# Patient Record
Sex: MALE | Race: WHITE | Employment: UNEMPLOYED | URBAN - METROPOLITAN AREA
[De-identification: names, ages, dates, MRNs, and addresses within clinical notes are randomized per-mention and may not be internally consistent; named-entity substitution may affect disease eponyms.]

---
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
## 2021-04-14 ENCOUNTER — OFFICE VISIT (OUTPATIENT)
Dept: ENT CLINIC | Age: 21
End: 2021-04-14
Payer: COMMERCIAL

## 2021-04-14 VITALS
TEMPERATURE: 98.8 F | HEART RATE: 63 BPM | BODY MASS INDEX: 23.76 KG/M2 | HEIGHT: 72 IN | OXYGEN SATURATION: 98 % | SYSTOLIC BLOOD PRESSURE: 111 MMHG | WEIGHT: 175.4 LBS | DIASTOLIC BLOOD PRESSURE: 78 MMHG

## 2021-04-14 DIAGNOSIS — J34.89 NASAL MASS: Primary | ICD-10-CM

## 2021-04-14 DIAGNOSIS — J30.81 ALLERGIC RHINITIS DUE TO ANIMAL HAIR AND DANDER: ICD-10-CM

## 2021-04-14 PROCEDURE — 99203 OFFICE O/P NEW LOW 30 MIN: CPT | Performed by: OTOLARYNGOLOGY

## 2021-04-14 RX ORDER — METHYLPREDNISOLONE 4 MG/1
TABLET ORAL
Qty: 1 KIT | Refills: 0 | Status: SHIPPED | OUTPATIENT
Start: 2021-04-14 | End: 2021-04-28 | Stop reason: ALTCHOICE

## 2021-04-14 RX ORDER — FLUTICASONE PROPIONATE 44 MCG
AEROSOL WITH ADAPTER (GRAM) INHALATION
COMMUNITY
Start: 2021-02-12

## 2021-04-14 RX ORDER — AZELASTINE 1 MG/ML
SPRAY, METERED NASAL
COMMUNITY
Start: 2021-03-27

## 2021-04-14 ASSESSMENT — ENCOUNTER SYMPTOMS
SHORTNESS OF BREATH: 0
STRIDOR: 0
ANAL BLEEDING: 0
EYE DISCHARGE: 0
VOICE CHANGE: 0
TROUBLE SWALLOWING: 0
CHOKING: 0
CHEST TIGHTNESS: 0
NAUSEA: 0
PHOTOPHOBIA: 0
DIARRHEA: 0
FACIAL SWELLING: 0
VOMITING: 0
WHEEZING: 0
RHINORRHEA: 0
SORE THROAT: 0
EYE REDNESS: 0
EYE PAIN: 0
SINUS PAIN: 0
ABDOMINAL DISTENTION: 0
EYE ITCHING: 0
RECTAL PAIN: 0
COUGH: 0
BLOOD IN STOOL: 0
ABDOMINAL PAIN: 0
APNEA: 0
CONSTIPATION: 0
SINUS PRESSURE: 0
COLOR CHANGE: 0
BACK PAIN: 0

## 2021-04-14 NOTE — PROGRESS NOTES
Review of Systems   Constitutional: Negative for activity change, appetite change, chills, diaphoresis, fatigue, fever and unexpected weight change. HENT: Negative for congestion, dental problem, drooling, ear discharge, ear pain, facial swelling, hearing loss, mouth sores, nosebleeds, postnasal drip, rhinorrhea, sinus pressure, sinus pain, sneezing, sore throat, tinnitus, trouble swallowing and voice change. Eyes: Negative for photophobia, pain, discharge, redness, itching and visual disturbance. Respiratory: Negative for apnea, cough, choking, chest tightness, shortness of breath, wheezing and stridor. Cardiovascular: Negative for chest pain, palpitations and leg swelling. Gastrointestinal: Negative for abdominal distention, abdominal pain, anal bleeding, blood in stool, constipation, diarrhea, nausea, rectal pain and vomiting. Endocrine: Negative for cold intolerance, heat intolerance, polydipsia, polyphagia and polyuria. Genitourinary: Negative for decreased urine volume, difficulty urinating, discharge, dysuria, enuresis, flank pain, frequency, genital sores, hematuria, penile pain, penile swelling, scrotal swelling, testicular pain and urgency. Musculoskeletal: Negative for arthralgias, back pain, gait problem, joint swelling, myalgias, neck pain and neck stiffness. Skin: Negative for color change, pallor, rash and wound. Allergic/Immunologic: Positive for environmental allergies. Negative for food allergies and immunocompromised state. Neurological: Negative for dizziness, tremors, seizures, syncope, facial asymmetry, speech difficulty, weakness, light-headedness, numbness and headaches. Hematological: Negative for adenopathy. Does not bruise/bleed easily. Psychiatric/Behavioral: Negative for agitation, behavioral problems, confusion, decreased concentration, dysphoric mood, hallucinations, self-injury, sleep disturbance and suicidal ideas.  The patient is not nervous/anxious and is not hyperactive.

## 2021-04-14 NOTE — PROGRESS NOTES
[facility] [address] [place] [address] [facility]  [address] [address]  Dept: [phone]  MD Dima Rios 24 y.o. male     Patient presents with a chief complaint of New Patient (Nasal polyps, Patient noticed it about a month ago, referred by Serena DIAMOND)       /78 (Site: Right Upper Arm, Position: Sitting, Cuff Size: Medium Adult)   Pulse 63   Temp 98.8 °F (37.1 °C) (Temporal)   Ht 6' (1.829 m)   Wt 175 lb 6.4 oz (79.6 kg)   SpO2 98%   BMI 23.79 kg/m²       History of Presenting Illness: The patient/caregiver reports a history of complaint with the following features: Onset:  1 month ago Pt noticed a polyp in right side of nose  Timing:  Year round allergies. Duration:  Constant polyp since he noticed it. Quality:  Nasal polyp right side of nose. Nasal congestion bilaterally. Rarely can smell. Decreased taste as well. Denies nasal bleeding. Denies nasal drainage. Location:  Nose   Severity:  Trouble smelling, trouble breathing through nose  Risk factors:   Pt denies prior sinus imaging. Denies Hx of nasal or sinus surgery. Hx of environmental/seasonal allergies. Has seen the allergist and has had allergy testing. Pt brought some records with him. Maternal grandfather with Hx of nasal/sinus polyp that he had removed. Denies Hx of nasal, facial, sinus or head injury. Alleviating factors:  Using Flonase (using x couple years), azelastine, Zytrec, Singulair. Improved allergy symptoms since using azelastine x 2 weeks, but still congested. Hasn't tried saline irrigations. No relief with amoxicillin. Indicates hasn't tried an oral steroid before. Aggravating factors: Allergies   Associated factors:   Denies fever or chills. Review of systems covering 10 systems is reviewed as staff entry in other note and pertinent positives and negatives noted.     Past Medical History:   Diagnosis Date    Allergic Current Outpatient Medications:     FLOVENT HFA 44 MCG/ACT inhaler, , Disp: , Rfl:     fluticasone (VERAMYST) 27.5 MCG/SPRAY nasal spray, 2 sprays by Each Nostril route daily, Disp: , Rfl:     methylPREDNISolone (MEDROL DOSEPACK) 4 MG tablet, Take by mouth., Disp: 1 kit, Rfl: 0    montelukast (SINGULAIR) 10 MG tablet, Take 10 mg by mouth nightly, Disp: , Rfl:     beclomethasone (QVAR) 80 MCG/ACT inhaler, Inhale 2 puffs into the lungs daily, Disp: , Rfl:     loratadine (CLARITIN) 10 MG tablet, Take 10 mg by mouth daily, Disp: , Rfl:     azelastine (ASTELIN) 0.1 % nasal spray, INSTILL 2 SPRAYS INTO EACH NOSTRIL TWICE A DAY, Disp: , Rfl:     albuterol sulfate  (90 BASE) MCG/ACT inhaler, Inhale 2 puffs into the lungs every 6 hours as needed for Wheezing Takes before games, basketball, Disp: , Rfl:    No Known Allergies   Past Surgical History:   Procedure Laterality Date    MS REMOVAL OF NAIL PLATE        Social History     Socioeconomic History    Marital status: Single     Spouse name: Not on file    Number of children: Not on file    Years of education: Not on file    Highest education level: Not on file   Occupational History    Not on file   Social Needs    Financial resource strain: Not on file    Food insecurity     Worry: Not on file     Inability: Not on file    Transportation needs     Medical: Not on file     Non-medical: Not on file   Tobacco Use    Smoking status: Never Smoker    Smokeless tobacco: Never Used   Substance and Sexual Activity    Alcohol use: Not on file    Drug use: Not on file    Sexual activity: Not on file   Lifestyle    Physical activity     Days per week: Not on file     Minutes per session: Not on file    Stress: Not on file   Relationships    Social connections     Talks on phone: Not on file     Gets together: Not on file     Attends Pent[employer]al service: Not on file     Active member of club or organization: Not on file     Attends meetings of clubs translucent, mobile to pneumatic otoscopy, no perforation    Left Tympanic Membrane: normal landmarks, translucent, mobile to pneumatic otoscopy, no perforation    Hearing: intact to spoken voice, intact to finger rub    NOSE:    Nasal Skin: no lesions, no lacerations, no scars    Nasal Dorsum: symmetric with no visible or palpable deformities    Nasal Tip: normal symmetric nasal tip, normal nasal valves    Nasal Mucosa: large obstructing right nasal polyp, bilateral boggy pale mucosa    Septum: not markedly deformed, midline, no exposed vessels, no bleeding, no septal granuloma    Turbinates: normal size and conformation    Nasopharynx: normal    ORAL CAVITY/MOUTH:    Lips, teeth, gums: normal lips, normal gums, dentition intact, no dental pain on palpation    Oral Mucosa: normal, moist, no lesions    Palate: normal hard palate, normal soft palate, symmetric palatal elevation    Floor of Mouth: normal floor of mouth    Tongue: normal tongue, no lesions, no edema, no masses, normal mucosa, mobile    Tonsils: normal tonsils, symmetric, no lesions    Posterior pharynx: normal    NECK:    Neck: no masses, trachea midline, normal range of motion, no cysts or pits, no tenderness to palpation    Thyroid: normal thyroid, no enlargement, no tenderness, no nodules    LYMPH NODES:    Cervical: no palpable lymph node enlargement    SKIN:    General Appearance: no lesions, warm and dry, normal turgor, no bruising    NEUROLOGICAL SYSTEM:    Orientation: oriented to time, oriented to place, oriented to person    Cranial Nerves: Cranial Nerves II-XII intact, normal facial movement    PSYCHIATRIC:    Mood and affect: normal mood, normal affect          Assessment and Plan:    He present with a large right nasal polyp dn although bleeding is denied, the risk of  Juvenile nasopharyngeal angiofibroma is raised with CT with contrast advised.   Given his family history however, this seems an unlikely cause and a course of oral steroid for presumed nasal polyps is offered. Diagnosis Orders   1. Nasal mass  CT SINUS W CONTRAST    methylPREDNISolone (MEDROL DOSEPACK) 4 MG tablet   2. Allergic rhinitis due to animal hair and dander        Return in about 2 weeks (around 4/28/2021). The patient and/or caregiver is to notify the office if no improvement or worsening of symptoms is noted prior to the scheduled follow-up for sooner evaluation. The patient and/or caregiver is able to state an understanding of these recommendations and is agreeable to the treatment plan. --Lena Collazo MD on 4/14/2021 at 1:55 PM    An electronic signature was used to authenticate this note.

## 2021-04-19 ENCOUNTER — HOSPITAL ENCOUNTER (OUTPATIENT)
Dept: CT IMAGING | Age: 21
Discharge: HOME | End: 2021-04-21
Payer: COMMERCIAL

## 2021-04-19 DIAGNOSIS — J34.89 NASAL MASS: ICD-10-CM

## 2021-04-19 PROCEDURE — 6360000004 HC RX CONTRAST MEDICATION: Performed by: OTOLARYNGOLOGY

## 2021-04-19 PROCEDURE — 70487 CT MAXILLOFACIAL W/DYE: CPT

## 2021-04-19 RX ADMIN — IOPAMIDOL 75 ML: 755 INJECTION, SOLUTION INTRAVENOUS at 17:11

## 2021-04-28 ENCOUNTER — OFFICE VISIT (OUTPATIENT)
Dept: ENT CLINIC | Age: 21
End: 2021-04-28
Payer: COMMERCIAL

## 2021-04-28 VITALS — TEMPERATURE: 98.8 F | RESPIRATION RATE: 16 BRPM | HEIGHT: 72 IN | WEIGHT: 175 LBS | BODY MASS INDEX: 23.7 KG/M2

## 2021-04-28 DIAGNOSIS — J32.3 CHRONIC SPHENOIDAL SINUSITIS: ICD-10-CM

## 2021-04-28 DIAGNOSIS — J32.0 CHRONIC SINUSITIS OF BOTH MAXILLARY SINUSES: ICD-10-CM

## 2021-04-28 DIAGNOSIS — J32.1 CHRONIC FRONTAL SINUSITIS: ICD-10-CM

## 2021-04-28 DIAGNOSIS — J33.9 NASAL POLYPS: Primary | ICD-10-CM

## 2021-04-28 DIAGNOSIS — J32.2 CHRONIC ETHMOIDAL SINUSITIS: ICD-10-CM

## 2021-04-28 PROCEDURE — 99214 OFFICE O/P EST MOD 30 MIN: CPT | Performed by: OTOLARYNGOLOGY

## 2021-04-28 ASSESSMENT — ENCOUNTER SYMPTOMS
EYE ITCHING: 0
EYE PAIN: 0
ABDOMINAL DISTENTION: 0
SHORTNESS OF BREATH: 0
VOICE CHANGE: 0
STRIDOR: 0
COLOR CHANGE: 0
SINUS PRESSURE: 0
APNEA: 0
BACK PAIN: 0
EYE DISCHARGE: 0
RHINORRHEA: 0
PHOTOPHOBIA: 0
ANAL BLEEDING: 0
SORE THROAT: 0
CHEST TIGHTNESS: 0
RECTAL PAIN: 0
BLOOD IN STOOL: 0
FACIAL SWELLING: 0
NAUSEA: 0
DIARRHEA: 0
SINUS PAIN: 0
COUGH: 0
TROUBLE SWALLOWING: 0
CONSTIPATION: 0
VOMITING: 0
ABDOMINAL PAIN: 0
EYE REDNESS: 0
CHOKING: 0
WHEEZING: 0

## 2021-04-28 NOTE — PROGRESS NOTES
[facility] [address], [facility]  [address] [address]  Dept: [phone]  MD Latrice Khan 24 y.o. male     Patient presents with a chief complaint of Follow-up (Patient returns for follow-up from sinus CT.)       Temp 98.8 °F (37.1 °C) (Infrared)   Resp 16   Ht 6' (1.829 m)   Wt 175 lb (79.4 kg)   BMI 23.73 kg/m²       History of Presenting Illness: The patient/caregiver reports a history of complaint with the following features: Onset:  1 month ago Pt noticed a polyp in right side of nose  Timing:  Year round allergies. Duration:  Reduced polyp since he noticed it with steroids  Quality:  Nasal polyp right side of nose. Nasal congestion bilaterally. Rarely can smell. Decreased taste as well. Denies nasal bleeding. Denies nasal drainage. Location:  Nose   Severity:  Trouble smelling, trouble breathing through nose  Risk factors:   Pt denies prior sinus imaging. Denies Hx of nasal or sinus surgery. Hx of environmental/seasonal allergies. Has seen the allergist and has had allergy testing. Pt brought some records with him. Maternal grandfather with Hx of nasal/sinus polyp that he had removed. Denies Hx of nasal, facial, sinus or head injury. Alleviating factors:  Using Flonase (using x couple years), azelastine, Zytrec, Singulair. Improved allergy symptoms since using azelastine x 2 weeks, but still congested. Hasn't tried saline irrigations. No relief with amoxicillin. Indicates hasn't tried an oral steroid before. Aggravating factors: Allergies   Associated factors:   Denies fever or chills. Review of systems covering 10 systems is reviewed as staff entry in other note and pertinent positives and negatives noted.     Past Medical History:   Diagnosis Date    Allergic        Current Outpatient Medications:     azelastine (ASTELIN) 0.1 % nasal spray, INSTILL 2 SPRAYS INTO EACH NOSTRIL TWICE A DAY, Disp: , Rfl:     FLOVENT HFA 44 MCG/ACT inhaler, , Disp: , Rfl:     fluticasone (VERAMYST) 27.5 MCG/SPRAY nasal spray, 2 sprays by Each Nostril route daily, Disp: , Rfl:     beclomethasone (QVAR) 80 MCG/ACT inhaler, Inhale 2 puffs into the lungs daily, Disp: , Rfl:     albuterol sulfate  (90 BASE) MCG/ACT inhaler, Inhale 2 puffs into the lungs every 6 hours as needed for Wheezing Takes before games, basketball, Disp: , Rfl:     loratadine (CLARITIN) 10 MG tablet, Take 10 mg by mouth daily, Disp: , Rfl:     montelukast (SINGULAIR) 10 MG tablet, Take 10 mg by mouth nightly, Disp: , Rfl:    No Known Allergies   Past Surgical History:   Procedure Laterality Date    LA REMOVAL OF NAIL PLATE        Social History     Socioeconomic History    Marital status: Single     Spouse name: Not on file    Number of children: Not on file    Years of education: Not on file    Highest education level: Not on file   Occupational History    Not on file   Social Needs    Financial resource strain: Not on file    Food insecurity     Worry: Not on file     Inability: Not on file    Transportation needs     Medical: Not on file     Non-medical: Not on file   Tobacco Use    Smoking status: Never Smoker    Smokeless tobacco: Never Used   Substance and Sexual Activity    Alcohol use: Not on file    Drug use: Not on file    Sexual activity: Not on file   Lifestyle    Physical activity     Days per week: Not on file     Minutes per session: Not on file    Stress: Not on file   Relationships    Social connections     Talks on phone: Not on file     Gets together: Not on file     Attends Hoahaoism service: Not on file     Active member of club or organization: Not on file     Attends meetings of clubs or organizations: Not on file     Relationship status: Not on file    Intimate partner violence     Fear of current or ex partner: Not on file     Emotionally abused: Not on file     Physically abused: Not on file     Forced sexual activity: Not on file   Other Topics Concern    Not on file   Social History Narrative    Not on file     History reviewed. No pertinent family history.      PHYSICAL EXAM:    The patient was examined today 4/28/2021 with findings as follows:    CONSTITUTIONAL:    General Appearance: well-appearing, nontoxic, alert, no acute distress     Communication: understanding at normal conversational tones, normal voicing, speech intelligible    HEAD/FACE:    Head: atraumatic, normocephalic, no lesions    Facial Inspection: no lesions, healthy skin    Facial Strength: motor strength normal, symmetric strength, symmetric movement, motor strength    Sinuses: no sinus tenderness    Salivary Glands: no enlargements of parotid glands, no tenderness of parotid glands, no masses of parotid glands, clear salivary flow on palpation from Stensen's ducts, no duct stones of Stensen's duct, no enlargement of submandibular glands, no tenderness of submandibular glands, no masses of submandibular glands, clear salivary flow from Metamora's ducts, no stones of Metamora's ducts    Temporomandibular Joint: no crepitus with motion, no tenderness on palpation, no trismus, motion symmetric    EYES:    Pupils: PERRLA, extra-ocular movements intact, no nystagmus, sclera white, no redness of eyes, no watering of eyes    EARS:    Bilateral External Ears: no pits, no tags    Right External Ear: normally formed, no lesions, no mastoid tenderness    Left External Ear: normally formed, no lesions, no mastoid tenderness    Right External Auditory Canal: normal, healthy skin, no obstructing cerumen, no discharge    Left External Auditory Canal: normal, healthy skin, no obstructing cerumen, no discharge    Right Tympanic Membrane: normal landmarks, translucent, mobile to pneumatic otoscopy, no perforation    Left Tympanic Membrane: normal landmarks, translucent, mobile to pneumatic otoscopy, no perforation    Hearing: intact to spoken voice, intact to finger rub    NOSE:    Nasal Skin: no lesions, no lacerations, no scars    Nasal Dorsum: symmetric with no visible or palpable deformities    Nasal Tip: normal symmetric nasal tip, normal nasal valves    Nasal Mucosa: large obstructing right nasal polyp significantly reduced, small left polyps, bilateral boggy pale mucosa    Septum: not markedly deformed, midline, no exposed vessels, no bleeding, no septal granuloma    Turbinates: normal size and conformation    Nasopharynx: normal    ORAL CAVITY/MOUTH:    Lips, teeth, gums: normal lips, normal gums, dentition intact, no dental pain on palpation    Oral Mucosa: normal, moist, no lesions    Palate: normal hard palate, normal soft palate, symmetric palatal elevation    Floor of Mouth: normal floor of mouth    Tongue: normal tongue, no lesions, no edema, no masses, normal mucosa, mobile    Tonsils: normal tonsils, symmetric, no lesions    Posterior pharynx: normal    NECK:    Neck: no masses, trachea midline, normal range of motion, no cysts or pits, no tenderness to palpation    Thyroid: normal thyroid, no enlargement, no tenderness, no nodules    LYMPH NODES:    Cervical: no palpable lymph node enlargement    RESPIRATORY:    Inspection/Auscultation: good air movement, chest expands symmetrically, normal breath sounds, no wheezing, no stridor    CARDIOVASCULAR SYSTEM:    Auscultation: regular rate and rhythm, carotid pulse normal, no carotid thrills, no carotid bruits    Observation/Palpation of Peripheral Vascular System: no varicosities, no cyanosis, no edema    SKIN:    General Appearance: no lesions, warm and dry, normal turgor, no bruising    NEUROLOGICAL SYSTEM:    Orientation: oriented to time, oriented to place, oriented to person    Cranial Nerves: Cranial Nerves II-XII intact, normal facial movement    PSYCHIATRIC:    Mood and affect: normal mood, normal affect          Assessment and Plan:    He has had a significant reduction in his nasal polyps with CT showing extensive bilateral obstructing polyps and chronic sinus disease. Surgical treatment is recommended upon review of the patient's history, clinical presentation, exam, and available testing and laboratory data. The risks, alternatives, potential complications, and benefits of surgery are discussed at length. The surgical procedure, pre-operative preparation, and post-operative course are discussed. Any questions are answered to the patient's and/or caregiver's satisfaction and they are agreeable to proceed. An informational sheet covering this information is also provided. Witnessed informed surgical consent is signed in the office. The patient and/or caregiver is able to state an understanding of these recommendations and is agreeable to the treatment plan. Diagnosis Orders   1. Nasal polyps     2. Chronic sinusitis of both maxillary sinuses     3. Chronic ethmoidal sinusitis     4. Chronic sphenoidal sinusitis     5. Chronic frontal sinusitis        No follow-ups on file. The patient and/or caregiver is to notify the office if no improvement or worsening of symptoms is noted prior to the scheduled follow-up for sooner evaluation. The patient and/or caregiver is able to state an understanding of these recommendations and is agreeable to the treatment plan. --Laurel Halsted, MD on 4/28/2021 at 3:46 PM    An electronic signature was used to authenticate this note.

## 2021-04-28 NOTE — PATIENT INSTRUCTIONS
SINUS SURGERY     The nasal sinuses are large air filled cavities within the facial skeleton. In health, they are lined by a thin layer of tissue that is self cleaning through small natural openings into the nasal cavities. In disease the natural cleaning cycle may be interrupted, or the natural openings may become blocked by scar tissue, polyps, or other abnormal tissues causing chronic or recurrent infection. Sinus infections can result in significant loss of quality of life. In severe cases, life threatening infection of the eye or brain may result. The goal of sinus surgery is to remove infection and abnormal tissues and to restore the natural cleaning cycle of the nasal sinuses. REASONS FOR SURGERY- How is the decision made? Undergoing sinus surgery is a decision to be made between the surgeon and the patient or family. The procedure may also be combined with other surgical procedures such as septoplasty. The procedure may be performed through the use of endoscopes or with CT image guidance. Generally, it is recommended when recurrent infection of the sinuses is poorly controlled with antibiotic therapy, occurs with excessive frequency, or has resulted in additional complications. Some common conditions treated by sinus surgery are listed below. Recurrent or frequent sinus infection  Nasal and sinus polyps with blockage of normal sinus drainage pathways  Abnormal tissue growth in the sinus or nasal cavities  Other problems as discussed with your doctor    SURGICAL TREATMENT- What are the Risks, Alternatives, Potential Complications, and Benefits? Risks- Bleeding is the most common risk of sinus surgery. This can occur from the raw tissue edges, or sometimes from injury to the many blood vessels investing the sinus cavities and can sometimes be severe requiring additional treatment. Abnormal healing or scarring may require repeated surgery. There is a small risk from anesthesia.   Alternatives- Sinus surgery may be elective, which means that you may choose to have no treatment or to treat the problem with medication or by other means. Sometimes a decision to not have treatment can have serious consequences that you should discuss with your doctor. Complications- Very rarely, injury to the lining of the brain may result in leakage of clear fluid from around the brain and into the nose. Injury to the lining of the eye, or the nerve to the eye may occur resulting in swelling of the eye, double vision, or very rarely, blindness. Benefits- Most sinus surgery is without complications and the success in relieving most conditions is excellent with rapid recovery. RECOVERY- What should I expect? Most patients have a very rapid recovery and can resume normal activities later the same day as surgery after a short observation period. Once you have gone home, common events in the recovery period and expectations of what is normal are listed below. Call your doctor if you have any questions or concerns that are not answered here. Pain- Pain is usually moderate if present. This can be treated with Tylenol or prescription pain medication. Over the counter medications such as Aspirin?, Advil?, Motrin?, Aleve?, and ibuprofen may cause an increased risk of bleeding and should be used with caution. If pain is severe, or not relieved by the pain medication listed above, call your doctor. Swelling of the eye or changes in vision- THIS IS NOT NORMAL. Call your doctor IMMEDIATELY for any swelling around the eye, eye pain, or changes in vision. Bleeding- This should be minimal and resolve 1-2 days after surgery if present. If bleeding is greater than a slow drip, soaks through more than one rolled gauze pad an hour for several hours, becomes more brisk, or is accompanied by severe pain, call your doctor. Drainage- This may occur for a few days after surgery and should be clear to pink in color.   If bright red blood, pus, or foul smelling drainage, or a rush of clear fluid from the nose when bending over is noted, call your doctor. Nausea and vomiting- These are usually due to the effects of anesthesia and should subside in the first day or two. If they continue beyond this, call your doctor. Fever- A low grade temperature of less than 102º F for a few days after surgery is normal.  Notify your doctor for fever above this, or one that persists for greater than 3 days. Eating and drinking- A regular diet is usually well tolerated. Activity- Light activity is permitted. Avoid heavy lifting or exertion (lift nothing heavier than a gallon jug.)  Do not blow your nose. Any drainage may be blotted with a tissue. Do not drive or operate machinery while on narcotic pain medications. You may return to work when you feel up to it, usually in 3-5 days after the first follow-up visit. MEDICATIONS- Ask your doctor about resuming your home medications. Do not take herbal medications without asking your doctor as these often have blood thinning properties which can increase the risk of bleeding. Your doctor may place you on antibiotics or steroid medications prior to surgery, after surgery, or both. It is important to complete any antibiotics that you have started, even if you start to feel better, to prevent recurrent infection. CONTINUING CARE- What additional care do I need after surgery? Sinus debridement- Your doctor may ask that you return to the office for debridement (removal of debris) of the sinus cavities after surgery. This is to promote healing, reduce infection and scar formation, and to ensure that the sinus cavities remain open through the healing process. Sinus irrigations-Your doctor may ask you to perform saline rinses of the sinus cavities. This is important to clear secretions, blood, and infection, and to prevent scar formation.    Follow-up- Your doctor will see you for follow-up evaluation in approximately 7-10 days to check on your healing. Call the office if an appointment has not been previously scheduled. If you are doing well at that time, clearance for return to work and regular activities will be given. A repeat exam at one month will likely be scheduled at that time unless you require debridement. If you experience any problems or have any concerns during your recovery, please call the office. NOTICE:  THIS DOCUMENT IS INTENDED SOLELY FOR PATIENT EDUCATIONAL PURPOSES AND IS PROVIDED AS A COURTESY TO PATIENTS OF DR. Richa Graham MD AND Jazmyne Mackenzie PA-C. IT IS NOT INTENDED AS A SUBSTITUTE FOR PROFESSIONAL MEDICAL CARE OR ADVICE. THE PATIENT SHOULD SEEK ADVICE FROM THE PHYSICIAN IF THERE ARE ANY QUESTIONS ABOUT THE CONTENTS OR DIRECTIONS PROVIDED IN THIS DOCUMENT.

## 2021-05-21 ENCOUNTER — HOSPITAL ENCOUNTER (OUTPATIENT)
Dept: HOSPITAL 100 - SDC | Age: 21
End: 2021-05-21
Payer: COMMERCIAL

## 2021-05-21 VITALS
RESPIRATION RATE: 16 BRPM | SYSTOLIC BLOOD PRESSURE: 132 MMHG | TEMPERATURE: 99.3 F | OXYGEN SATURATION: 100 % | HEART RATE: 72 BPM | DIASTOLIC BLOOD PRESSURE: 69 MMHG

## 2021-05-21 VITALS
SYSTOLIC BLOOD PRESSURE: 127 MMHG | DIASTOLIC BLOOD PRESSURE: 69 MMHG | HEART RATE: 64 BPM | RESPIRATION RATE: 16 BRPM | OXYGEN SATURATION: 96 %

## 2021-05-21 VITALS
RESPIRATION RATE: 16 BRPM | OXYGEN SATURATION: 98 % | DIASTOLIC BLOOD PRESSURE: 69 MMHG | TEMPERATURE: 98.42 F | HEART RATE: 64 BPM | SYSTOLIC BLOOD PRESSURE: 141 MMHG

## 2021-05-21 VITALS
TEMPERATURE: 97.9 F | OXYGEN SATURATION: 100 % | RESPIRATION RATE: 16 BRPM | SYSTOLIC BLOOD PRESSURE: 141 MMHG | DIASTOLIC BLOOD PRESSURE: 95 MMHG | HEART RATE: 57 BPM

## 2021-05-21 VITALS
HEART RATE: 62 BPM | SYSTOLIC BLOOD PRESSURE: 132 MMHG | DIASTOLIC BLOOD PRESSURE: 67 MMHG | RESPIRATION RATE: 16 BRPM | OXYGEN SATURATION: 93 %

## 2021-05-21 VITALS
DIASTOLIC BLOOD PRESSURE: 69 MMHG | RESPIRATION RATE: 16 BRPM | HEART RATE: 66 BPM | OXYGEN SATURATION: 99 % | SYSTOLIC BLOOD PRESSURE: 132 MMHG

## 2021-05-21 VITALS
SYSTOLIC BLOOD PRESSURE: 132 MMHG | DIASTOLIC BLOOD PRESSURE: 66 MMHG | HEART RATE: 53 BPM | RESPIRATION RATE: 14 BRPM | OXYGEN SATURATION: 97 % | TEMPERATURE: 99.8 F

## 2021-05-21 VITALS — BODY MASS INDEX: 24.7 KG/M2

## 2021-05-21 DIAGNOSIS — J33.9: ICD-10-CM

## 2021-05-21 DIAGNOSIS — J32.0: Primary | ICD-10-CM

## 2021-05-21 DIAGNOSIS — J32.3: ICD-10-CM

## 2021-05-21 DIAGNOSIS — Z86.16: ICD-10-CM

## 2021-05-21 DIAGNOSIS — J32.2: ICD-10-CM

## 2021-05-21 DIAGNOSIS — J45.909: ICD-10-CM

## 2021-05-21 DIAGNOSIS — Z79.899: ICD-10-CM

## 2021-05-21 DIAGNOSIS — Z79.51: ICD-10-CM

## 2021-05-21 DIAGNOSIS — J32.1: ICD-10-CM

## 2021-05-21 PROCEDURE — 00160 ANES PX NOSE&SINUS NOS: CPT

## 2021-05-21 PROCEDURE — 31267 ENDOSCOPY MAXILLARY SINUS: CPT

## 2021-05-21 PROCEDURE — 88305 TISSUE EXAM BY PATHOLOGIST: CPT

## 2021-05-21 PROCEDURE — 31259 NSL/SINS NDSC SPHN TISS RMVL: CPT

## 2021-05-21 PROCEDURE — 88304 TISSUE EXAM BY PATHOLOGIST: CPT

## 2021-05-21 PROCEDURE — 31276 NSL/SINS NDSC FRNT TISS RMVL: CPT

## 2021-05-21 PROCEDURE — 88311 DECALCIFY TISSUE: CPT

## 2021-05-21 RX ADMIN — LIDOCAINE HYDROCHLORIDE AND EPINEPHRINE 20 ML: 10; 10 INJECTION, SOLUTION INFILTRATION; PERINEURAL at 10:30

## 2021-05-21 RX ADMIN — OXYMETAZOLINE HYDROCHLORIDE 15 SPRAY: 0.05 SPRAY NASAL at 09:45

## 2021-05-21 RX ADMIN — LIDOCAINE HYDROCHLORIDE 50 ML: 40 SOLUTION TOPICAL at 09:45

## 2021-05-24 ENCOUNTER — TELEPHONE (OUTPATIENT)
Dept: ENT CLINIC | Age: 21
End: 2021-05-24

## 2021-05-24 ENCOUNTER — OUTSIDE SERVICES (OUTPATIENT)
Dept: ENT CLINIC | Age: 21
End: 2021-05-24

## 2021-05-24 NOTE — TELEPHONE ENCOUNTER
Spoke with patient's mother, Rainer Melissa. Status post sinus surgery with navigation performed by Dr Augie Hanson at [facility] on 05/21/21. POD 3. Mother states that patient is complaining of sore throat, nasal drainage and headache. Discussed that throat discomfort is typical following general anesthesia. Also reviewed information sheet for sinus surgery. Confirmed post-op appointment scheduled for 06/02/21. Will return call to patient tomorrow to follow-up. Encouraged to contact [facility] ENT office with any further questions or concerns.

## 2021-06-02 ENCOUNTER — OFFICE VISIT (OUTPATIENT)
Dept: ENT CLINIC | Age: 21
End: 2021-06-02
Payer: COMMERCIAL

## 2021-06-02 VITALS
SYSTOLIC BLOOD PRESSURE: 122 MMHG | TEMPERATURE: 97.3 F | DIASTOLIC BLOOD PRESSURE: 74 MMHG | WEIGHT: 174 LBS | BODY MASS INDEX: 23.57 KG/M2 | HEIGHT: 72 IN

## 2021-06-02 DIAGNOSIS — J32.2 CHRONIC ETHMOIDAL SINUSITIS: ICD-10-CM

## 2021-06-02 DIAGNOSIS — J32.0 CHRONIC SINUSITIS OF BOTH MAXILLARY SINUSES: ICD-10-CM

## 2021-06-02 DIAGNOSIS — J32.3 CHRONIC SPHENOIDAL SINUSITIS: ICD-10-CM

## 2021-06-02 DIAGNOSIS — J33.9 NASAL POLYPS: Primary | ICD-10-CM

## 2021-06-02 DIAGNOSIS — J32.1 CHRONIC FRONTAL SINUSITIS: ICD-10-CM

## 2021-06-02 PROCEDURE — 99213 OFFICE O/P EST LOW 20 MIN: CPT | Performed by: OTOLARYNGOLOGY

## 2021-06-02 ASSESSMENT — ENCOUNTER SYMPTOMS
SHORTNESS OF BREATH: 0
TROUBLE SWALLOWING: 0
RECTAL PAIN: 0
ABDOMINAL PAIN: 0
EYE ITCHING: 0
ANAL BLEEDING: 0
PHOTOPHOBIA: 0
COLOR CHANGE: 0
CHEST TIGHTNESS: 0
WHEEZING: 0
STRIDOR: 0
CONSTIPATION: 0
SINUS PRESSURE: 1
BLOOD IN STOOL: 0
VOICE CHANGE: 0
BACK PAIN: 0
VOMITING: 0
CHOKING: 0
EYE PAIN: 0
EYE DISCHARGE: 0
COUGH: 0
APNEA: 0
SORE THROAT: 1
EYE REDNESS: 0
NAUSEA: 0
RHINORRHEA: 0
DIARRHEA: 0
ABDOMINAL DISTENTION: 0
FACIAL SWELLING: 0
SINUS PAIN: 0

## 2021-06-02 NOTE — PROGRESS NOTES
[facility] [address], [facility]  [address] [address]  Dept: [phone]  MD Renetta Landry 24 y.o. male     Patient presents with a chief complaint of Post-Op Check (s/p sinus surgery 05/21/21. POD 12.)       /74 (Site: Left Upper Arm, Position: Sitting, Cuff Size: Medium Adult)   Temp 97.3 °F (36.3 °C) (Infrared)   Ht 6' (1.829 m)   Wt 174 lb (78.9 kg)   BMI 23.60 kg/m²       History of Presenting Illness: The patient/caregiver reports a history of complaint with the following features:    He reports that he is doing well after sinus surgery with reduced nasal congestion and improving and decreasing discharge. His headaches have improved and he denies fevers. Review of systems covering 10 systems is reviewed as staff entry in other note and pertinent positives and negatives noted.     Past Medical History:   Diagnosis Date    Allergic        Current Outpatient Medications:     azelastine (ASTELIN) 0.1 % nasal spray, INSTILL 2 SPRAYS INTO EACH NOSTRIL TWICE A DAY, Disp: , Rfl:     FLOVENT HFA 44 MCG/ACT inhaler, , Disp: , Rfl:     fluticasone (VERAMYST) 27.5 MCG/SPRAY nasal spray, 2 sprays by Each Nostril route daily, Disp: , Rfl:     montelukast (SINGULAIR) 10 MG tablet, Take 10 mg by mouth nightly, Disp: , Rfl:     beclomethasone (QVAR) 80 MCG/ACT inhaler, Inhale 2 puffs into the lungs daily, Disp: , Rfl:     albuterol sulfate  (90 BASE) MCG/ACT inhaler, Inhale 2 puffs into the lungs every 6 hours as needed for Wheezing Takes before games, basketball, Disp: , Rfl:     loratadine (CLARITIN) 10 MG tablet, Take 10 mg by mouth daily, Disp: , Rfl:    No Known Allergies   Past Surgical History:   Procedure Laterality Date    MI REMOVAL OF NAIL PLATE      SINUS SURGERY  05/21/2021      Social History     Socioeconomic History    Marital status: Single     Spouse name: Not on file    Number of children: Not on file    Years of education: Not on file    Highest education level: Not on file   Occupational History    Not on file   Tobacco Use    Smoking status: Never Smoker    Smokeless tobacco: Never Used   Vaping Use    Vaping Use: Never used   Substance and Sexual Activity    Alcohol use: Not on file    Drug use: Not on file    Sexual activity: Not on file   Other Topics Concern    Not on file   Social History Narrative    Not on file     Social Determinants of Health     Financial Resource Strain:     Difficulty of Paying Living Expenses:    Food Insecurity:     Worried About Running Out of Food in the Last Year:     [address] in the Last Year:    Transportation Needs:     Lack of Transportation (Medical):  Lack of Transportation (Non-Medical):    Physical Activity:     Days of Exercise per Week:     Minutes of Exercise per Session:    Stress:     Feeling of Stress :    Social Connections:     Frequency of Communication with Friends and Family:     Frequency of Social Gatherings with Friends and Family:     Attends Alevism Services:     Active Member of Clubs or Organizations:     Attends Club or Organization Meetings:     Marital Status:    Intimate Partner Violence:     Fear of Current or Ex-Partner:     Emotionally Abused:     Physically Abused:     Sexually Abused:      History reviewed. No pertinent family history.      PHYSICAL EXAM:    The patient was examined today 6/2/2021 with findings as follows:    CONSTITUTIONAL:    General Appearance: well-appearing, nontoxic, alert, no acute distress     Communication: understanding at normal conversational tones, normal voicing, speech intelligible    HEAD/FACE:    Head: atraumatic, normocephalic, no lesions    Facial Inspection: no lesions, healthy skin    Facial Strength: motor strength normal, symmetric strength, symmetric movement, motor strength    Sinuses: no sinus tenderness, sinusotomy sites patent with minimal crusting bilaterally    Salivary Glands: no enlargements of parotid glands, no tenderness of parotid glands, no masses of parotid glands, clear salivary flow on palpation from Stensen's ducts, no duct stones of Stensen's duct, no enlargement of submandibular glands, no tenderness of submandibular glands, no masses of submandibular glands, clear salivary flow from Hemanth's ducts, no stones of Dahlgren's ducts    Temporomandibular Joint: no crepitus with motion, no tenderness on palpation, no trismus, motion symmetric    EYES:    Pupils: PERRLA, extra-ocular movements intact, no nystagmus, sclera white, no redness of eyes, no watering of eyes    EARS:    Bilateral External Ears: no pits, no tags    Right External Ear: normally formed, no lesions, no mastoid tenderness    Left External Ear: normally formed, no lesions, no mastoid tenderness    Right External Auditory Canal: normal, healthy skin, no obstructing cerumen, no discharge    Left External Auditory Canal: normal, healthy skin, no obstructing cerumen, no discharge    Right Tympanic Membrane: normal landmarks, translucent, mobile to pneumatic otoscopy, no perforation    Left Tympanic Membrane: normal landmarks, translucent, mobile to pneumatic otoscopy, no perforation    Hearing: intact to spoken voice, intact to finger rub    NOSE:    Nasal Skin: no lesions, no lacerations, no scars    Nasal Dorsum: symmetric with no visible or palpable deformities    Nasal Tip: normal symmetric nasal tip, normal nasal valves    Nasal Mucosa: normal, pink and moist    Septum: not markedly deformed, midline, no exposed vessels, no bleeding, no septal granuloma    Turbinates: normal size and conformation    Nasopharynx: normal    SKIN:    General Appearance: no lesions, warm and dry, normal turgor, no bruising    NEUROLOGICAL SYSTEM:    Orientation: oriented to time, oriented to place, oriented to person    Cranial Nerves: Cranial Nerves II-XII intact, normal facial movement    PSYCHIATRIC:    Mood and affect: normal mood, normal affect          Assessment and Plan:    He is doing well after surgery for his extensive sinus polyps. I have asked that he begin rigorous nasal lavage to clear the remaining secretions with debridement planned at follow-up if needed. The patient and/or caregiver is advised on the use of any prescribed medication and the rationale for radiographic imaging. The patient and/or caregiver is advised on the use of saline nasal rinses for moisturization, decongestion, and cleansing of the nasal and sinus cavities and an informational sheet on the preparation and use of saline is provided. The patient and/or caregiver is to notify the office if no improvement or worsening of symptoms is noted prior to the scheduled follow-up for sooner evaluation. The risk of bowel infection on prolonged antibiotic therapy is discussed and the patient is asked to notify me for persistent diarrhea. The patient and/or caregiver is able to state an understanding of these recommendations and is agreeable to the treatment plan. Diagnosis Orders   1. Nasal polyps     2. Chronic sinusitis of both maxillary sinuses     3. Chronic ethmoidal sinusitis     4. Chronic frontal sinusitis     5. Chronic sphenoidal sinusitis        Return in about 3 weeks (around 6/23/2021). The patient and/or caregiver is to notify the office if no improvement or worsening of symptoms is noted prior to the scheduled follow-up for sooner evaluation. The patient and/or caregiver is able to state an understanding of these recommendations and is agreeable to the treatment plan. --Blessing Burks MD on 6/2/2021 at 9:25 AM    An electronic signature was used to authenticate this note.

## 2021-06-02 NOTE — PROGRESS NOTES
Review of Systems   Constitutional: Negative for activity change, appetite change, chills, diaphoresis, fatigue, fever and unexpected weight change. HENT: Positive for congestion, sinus pressure and sore throat. Negative for dental problem, drooling, ear discharge, ear pain, facial swelling, hearing loss, mouth sores, nosebleeds, postnasal drip, rhinorrhea, sinus pain, sneezing, tinnitus, trouble swallowing and voice change. Eyes: Negative for photophobia, pain, discharge, redness, itching and visual disturbance. Respiratory: Negative for apnea, cough, choking, chest tightness, shortness of breath, wheezing and stridor. Cardiovascular: Negative for chest pain, palpitations and leg swelling. Gastrointestinal: Negative for abdominal distention, abdominal pain, anal bleeding, blood in stool, constipation, diarrhea, nausea, rectal pain and vomiting. Endocrine: Negative for cold intolerance, heat intolerance, polydipsia, polyphagia and polyuria. Genitourinary: Negative for decreased urine volume, difficulty urinating, discharge, dysuria, enuresis, flank pain, frequency, genital sores, hematuria, penile pain, penile swelling, scrotal swelling, testicular pain and urgency. Musculoskeletal: Negative for arthralgias, back pain, gait problem, joint swelling, myalgias, neck pain and neck stiffness. Skin: Negative for color change, pallor, rash and wound. Allergic/Immunologic: Positive for environmental allergies. Negative for food allergies and immunocompromised state. Neurological: Positive for headaches. Negative for dizziness, tremors, seizures, syncope, facial asymmetry, speech difficulty, weakness, light-headedness and numbness. Hematological: Negative for adenopathy. Does not bruise/bleed easily. Psychiatric/Behavioral: Negative for agitation, behavioral problems, confusion, decreased concentration, dysphoric mood, hallucinations, self-injury, sleep disturbance and suicidal ideas.  The patient

## 2021-06-23 ENCOUNTER — OFFICE VISIT (OUTPATIENT)
Dept: ENT CLINIC | Age: 21
End: 2021-06-23
Payer: COMMERCIAL

## 2021-06-23 VITALS
SYSTOLIC BLOOD PRESSURE: 118 MMHG | HEART RATE: 58 BPM | WEIGHT: 182 LBS | OXYGEN SATURATION: 98 % | DIASTOLIC BLOOD PRESSURE: 72 MMHG | RESPIRATION RATE: 18 BRPM | BODY MASS INDEX: 24.65 KG/M2 | TEMPERATURE: 97.1 F | HEIGHT: 72 IN

## 2021-06-23 DIAGNOSIS — J33.9 NASAL POLYPS: Primary | ICD-10-CM

## 2021-06-23 DIAGNOSIS — J30.81 ALLERGIC RHINITIS DUE TO ANIMAL HAIR AND DANDER: ICD-10-CM

## 2021-06-23 DIAGNOSIS — J32.1 CHRONIC FRONTAL SINUSITIS: ICD-10-CM

## 2021-06-23 PROCEDURE — 99213 OFFICE O/P EST LOW 20 MIN: CPT | Performed by: OTOLARYNGOLOGY

## 2021-06-23 ASSESSMENT — ENCOUNTER SYMPTOMS
APNEA: 0
BLOOD IN STOOL: 0
DIARRHEA: 0
CHEST TIGHTNESS: 0
EYE DISCHARGE: 0
CONSTIPATION: 0
BACK PAIN: 0
SINUS PAIN: 0
FACIAL SWELLING: 0
ABDOMINAL DISTENTION: 0
EYE ITCHING: 0
STRIDOR: 0
CHOKING: 0
RECTAL PAIN: 0
PHOTOPHOBIA: 0
VOICE CHANGE: 0
NAUSEA: 0
SHORTNESS OF BREATH: 0
COLOR CHANGE: 0
SINUS PRESSURE: 0
RHINORRHEA: 0
ANAL BLEEDING: 0
VOMITING: 0
EYE REDNESS: 0
COUGH: 0
WHEEZING: 0
EYE PAIN: 0
SORE THROAT: 0
ABDOMINAL PAIN: 0
TROUBLE SWALLOWING: 0

## 2021-06-23 NOTE — PROGRESS NOTES
Review of Systems   Constitutional: Negative for activity change, appetite change, chills, diaphoresis, fatigue, fever and unexpected weight change. HENT: Negative for congestion, dental problem, drooling, ear discharge, ear pain, facial swelling, hearing loss, mouth sores, nosebleeds, postnasal drip, rhinorrhea, sinus pressure, sinus pain, sneezing, sore throat, tinnitus, trouble swallowing and voice change. Eyes: Negative for photophobia, pain, discharge, redness, itching and visual disturbance. Respiratory: Negative for apnea, cough, choking, chest tightness, shortness of breath, wheezing and stridor. Cardiovascular: Negative for chest pain, palpitations and leg swelling. Gastrointestinal: Negative for abdominal distention, abdominal pain, anal bleeding, blood in stool, constipation, diarrhea, nausea, rectal pain and vomiting. Endocrine: Negative for cold intolerance, heat intolerance, polydipsia, polyphagia and polyuria. Genitourinary: Negative for decreased urine volume, difficulty urinating, discharge, dysuria, enuresis, flank pain, frequency, genital sores, hematuria, penile pain, penile swelling, scrotal swelling, testicular pain and urgency. Musculoskeletal: Negative for arthralgias, back pain, gait problem, joint swelling, myalgias, neck pain and neck stiffness. Skin: Negative for color change, pallor, rash and wound. Allergic/Immunologic: Negative for environmental allergies, food allergies and immunocompromised state. Neurological: Negative for dizziness, tremors, seizures, syncope, facial asymmetry, speech difficulty, weakness, light-headedness, numbness and headaches. Hematological: Negative for adenopathy. Does not bruise/bleed easily. Psychiatric/Behavioral: Negative for agitation, behavioral problems, confusion, decreased concentration, dysphoric mood, hallucinations, self-injury, sleep disturbance and suicidal ideas.  The patient is not nervous/anxious and is not

## 2021-06-23 NOTE — PROGRESS NOTES
[facility] [address], NOSE & THROAT SPECIALISTS  [address] [address]  Dept: [phone]  MD Dima Rios 22 y.o. male     Patient presents with a chief complaint of Follow-up (Sinus surgery 5/21)       /72   Pulse 58   Temp 97.1 °F (36.2 °C)   Resp 18   Ht 6' (1.829 m)   Wt 182 lb (82.6 kg)   SpO2 98%   BMI 24.68 kg/m²       History of Presenting Illness: The patient/caregiver reports a history of complaint with the following features: Onset: resolved headaches, nasal congestion and loss of smell  Quality: no nasal congestion, mild right cheek foreign body sensation  Location: right cheek  Severity: pain none  Risk factors: prior nasal polyps surgery  Alleviating factors: relief with prior surgery  Aggravating factors: nothing makes it worse  Associated factors: can smell and is no longer having headaches at all    Review of systems covering 10 systems is reviewed as staff entry in other note and pertinent positives and negatives noted.     Past Medical History:   Diagnosis Date    Allergic        Current Outpatient Medications:     azelastine (ASTELIN) 0.1 % nasal spray, INSTILL 2 SPRAYS INTO EACH NOSTRIL TWICE A DAY, Disp: , Rfl:     FLOVENT HFA 44 MCG/ACT inhaler, , Disp: , Rfl:     fluticasone (VERAMYST) 27.5 MCG/SPRAY nasal spray, 2 sprays by Each Nostril route daily, Disp: , Rfl:     montelukast (SINGULAIR) 10 MG tablet, Take 10 mg by mouth nightly, Disp: , Rfl:     beclomethasone (QVAR) 80 MCG/ACT inhaler, Inhale 2 puffs into the lungs daily, Disp: , Rfl:     albuterol sulfate  (90 BASE) MCG/ACT inhaler, Inhale 2 puffs into the lungs every 6 hours as needed for Wheezing Takes before games, basketball, Disp: , Rfl:     loratadine (CLARITIN) 10 MG tablet, Take 10 mg by mouth daily, Disp: , Rfl:    No Known Allergies   Past Surgical History:   Procedure Laterality Date    [place] REMOVAL OF NAIL PLATE      SINUS SURGERY  05/21/2021      Social History     Socioeconomic History    Marital status: Single     Spouse name: Not on file    Number of children: Not on file    Years of education: Not on file    Highest education level: Not on file   Occupational History    Not on file   Tobacco Use    Smoking status: Never Smoker    Smokeless tobacco: Never Used   Vaping Use    Vaping Use: Never used   Substance and Sexual Activity    Alcohol use: Not on file    Drug use: Not on file    Sexual activity: Not on file   Other Topics Concern    Not on file   Social History Narrative    Not on file     Social Determinants of Health     Financial Resource Strain:     Difficulty of Paying Living Expenses:    Food Insecurity:     Worried About [address] in the Last Year:     [address] in the Last Year:    Transportation Needs:     Lack of Transportation (Medical):  Lack of Transportation (Non-Medical):    Physical Activity:     Days of Exercise per Week:     Minutes of Exercise per Session:    Stress:     Feeling of Stress :    Social Connections:     Frequency of Communication with Friends and Family:     Frequency of Social Gatherings with Friends and Family:     Attends Protestant Services:     Active Member of Clubs or Organizations:     Attends Club or Organization Meetings:     Marital Status:    Intimate Partner Violence:     Fear of Current or Ex-Partner:     Emotionally Abused:     Physically Abused:     Sexually Abused:      No family history on file.      PHYSICAL EXAM:    The patient was examined today 6/23/2021 with findings as follows:    CONSTITUTIONAL:    General Appearance: well-appearing, nontoxic, alert, no acute distress     Communication: understanding at normal conversational tones, normal voicing, speech intelligible    HEAD/FACE:    Head: atraumatic, normocephalic, no lesions    Facial Inspection: no lesions, healthy skin    Facial Strength: motor strength normal, symmetric strength, symmetric movement, motor strength    Sinuses: no sinus tenderness, patent sinusotomy sites bilaterally with mild crusting at right maxillary sinus ostium that is suctioned clear    Salivary Glands: no enlargements of parotid glands, no tenderness of parotid glands, no masses of parotid glands, clear salivary flow on palpation from Stensen's ducts, no duct stones of Stensen's duct, no enlargement of submandibular glands, no tenderness of submandibular glands, no masses of submandibular glands, clear salivary flow from [place]'s ducts, no stones of [place]'s ducts    Temporomandibular Joint: no crepitus with motion, no tenderness on palpation, no trismus, motion symmetric    EYES:    Pupils: PERRLA, extra-ocular movements intact, no nystagmus, sclera white, no redness of eyes, no watering of eyes    EARS:    Bilateral External Ears: no pits, no tags    Right External Ear: normally formed, no lesions, no mastoid tenderness    Left External Ear: normally formed, no lesions, no mastoid tenderness    Right External Auditory Canal: normal, healthy skin, no obstructing cerumen, no discharge    Left External Auditory Canal: normal, healthy skin, no obstructing cerumen, no discharge    Right Tympanic Membrane: normal landmarks, translucent, mobile to pneumatic otoscopy, no perforation    Left Tympanic Membrane: normal landmarks, translucent, mobile to pneumatic otoscopy, no perforation    Hearing: intact to spoken voice, intact to finger rub    NOSE:    Nasal Skin: no lesions, no lacerations, no scars    Nasal Dorsum: symmetric with no visible or palpable deformities    Nasal Tip: normal symmetric nasal tip, normal nasal valves    Nasal Mucosa: pale, boggy    Septum: not markedly deformed, midline, no exposed vessels, no bleeding, no septal granuloma    Turbinates: normal size and conformation    Nasopharynx: normal    ORAL CAVITY/MOUTH:    Lips, teeth, gums: normal lips, normal gums, dentition intact, no plan.       Diagnosis Orders   1. Nasal polyps     2. Allergic rhinitis due to animal hair and dander     3. Chronic frontal sinusitis        Return in about 6 months (around 12/23/2021). The patient and/or caregiver is to notify the office if no improvement or worsening of symptoms is noted prior to the scheduled follow-up for sooner evaluation. The patient and/or caregiver is able to state an understanding of these recommendations and is agreeable to the treatment plan. --Ramu Lopez MD on 6/23/2021 at 9:17 AM    An electronic signature was used to authenticate this note.

## 2021-10-05 ENCOUNTER — OFFICE VISIT (OUTPATIENT)
Dept: ENT CLINIC | Age: 21
End: 2021-10-05
Payer: COMMERCIAL

## 2021-10-05 VITALS
RESPIRATION RATE: 18 BRPM | DIASTOLIC BLOOD PRESSURE: 72 MMHG | OXYGEN SATURATION: 98 % | BODY MASS INDEX: 24.52 KG/M2 | TEMPERATURE: 97.3 F | SYSTOLIC BLOOD PRESSURE: 118 MMHG | WEIGHT: 181 LBS | HEIGHT: 72 IN | HEART RATE: 72 BPM

## 2021-10-05 DIAGNOSIS — J30.0 VASOMOTOR RHINITIS: Primary | ICD-10-CM

## 2021-10-05 DIAGNOSIS — J30.81 ALLERGIC RHINITIS DUE TO ANIMAL HAIR AND DANDER: ICD-10-CM

## 2021-10-05 DIAGNOSIS — J33.9 NASAL POLYPS: ICD-10-CM

## 2021-10-05 PROCEDURE — 99213 OFFICE O/P EST LOW 20 MIN: CPT | Performed by: OTOLARYNGOLOGY

## 2021-10-05 PROCEDURE — 92511 NASOPHARYNGOSCOPY: CPT | Performed by: OTOLARYNGOLOGY

## 2021-10-05 RX ORDER — IPRATROPIUM BROMIDE 42 UG/1
2 SPRAY, METERED NASAL NIGHTLY PRN
Qty: 15 ML | Refills: 3 | Status: SHIPPED | OUTPATIENT
Start: 2021-10-05

## 2021-10-05 RX ORDER — AZELASTINE 1 MG/ML
1 SPRAY, METERED NASAL 2 TIMES DAILY
Qty: 90 ML | Refills: 3 | Status: SHIPPED | OUTPATIENT
Start: 2021-10-05 | End: 2022-01-03

## 2021-10-05 ASSESSMENT — ENCOUNTER SYMPTOMS
CHOKING: 0
SINUS PRESSURE: 1
RHINORRHEA: 0
PHOTOPHOBIA: 0
EYE ITCHING: 0
DIARRHEA: 0
NAUSEA: 0
CONSTIPATION: 0
RECTAL PAIN: 0
CHEST TIGHTNESS: 0
ABDOMINAL DISTENTION: 0
ABDOMINAL PAIN: 0
TROUBLE SWALLOWING: 0
BLOOD IN STOOL: 0
FACIAL SWELLING: 0
SHORTNESS OF BREATH: 0
VOICE CHANGE: 0
APNEA: 0
EYE DISCHARGE: 0
VOMITING: 0
STRIDOR: 0
BACK PAIN: 0
SORE THROAT: 0
SINUS PAIN: 0
EYE PAIN: 0
COLOR CHANGE: 0
ANAL BLEEDING: 0
WHEEZING: 0
EYE REDNESS: 0
COUGH: 0

## 2021-10-05 NOTE — PROGRESS NOTES
Review of Systems   Constitutional: Negative for activity change, appetite change, chills, diaphoresis, fatigue, fever and unexpected weight change. HENT: Positive for sinus pressure. Negative for congestion, dental problem, drooling, ear discharge, ear pain, facial swelling, hearing loss, mouth sores, nosebleeds, postnasal drip, rhinorrhea, sinus pain, sneezing, sore throat, tinnitus, trouble swallowing and voice change. Eyes: Negative for photophobia, pain, discharge, redness, itching and visual disturbance. Respiratory: Negative for apnea, cough, choking, chest tightness, shortness of breath, wheezing and stridor. Cardiovascular: Negative for chest pain, palpitations and leg swelling. Gastrointestinal: Negative for abdominal distention, abdominal pain, anal bleeding, blood in stool, constipation, diarrhea, nausea, rectal pain and vomiting. Endocrine: Negative for cold intolerance, heat intolerance, polydipsia, polyphagia and polyuria. Genitourinary: Negative for decreased urine volume, difficulty urinating, discharge, dysuria, enuresis, flank pain, frequency, genital sores, hematuria, penile pain, penile swelling, scrotal swelling, testicular pain and urgency. Musculoskeletal: Negative for arthralgias, back pain, gait problem, joint swelling, myalgias, neck pain and neck stiffness. Skin: Negative for color change, pallor, rash and wound. Allergic/Immunologic: Negative for environmental allergies, food allergies and immunocompromised state. Neurological: Negative for dizziness, tremors, seizures, syncope, facial asymmetry, speech difficulty, weakness, light-headedness, numbness and headaches. Hematological: Negative for adenopathy. Does not bruise/bleed easily. Psychiatric/Behavioral: Negative for agitation, behavioral problems, confusion, decreased concentration, dysphoric mood, hallucinations, self-injury, sleep disturbance and suicidal ideas.  The patient is not nervous/anxious and is

## 2021-10-05 NOTE — PROGRESS NOTES
[facility] [address] [place] [address] EAR, NOSE & THROAT SPECIALISTS  [facility] [address]  Dept: [phone]  MD Alf Jerry 22 y.o. male     Patient presents with a chief complaint of Congestion (Rt side nasal seems blocked.)       /72   Pulse 72   Temp 97.3 °F (36.3 °C)   Resp 18   Ht 6' (1.829 m)   Wt 181 lb (82.1 kg)   SpO2 98%   BMI 24.55 kg/m²       History of Presenting Illness: The patient/caregiver reports a history of complaint with the following features: Onset: feeling congested again  Timing: comes and goes, alternates sided but mostly on right  Duration: lasts hours  Quality: nasal congestion  Location: right side  Severity: pain none  Risk factors: history of nasal polyps  Alleviating factors: nothing gives relief  Aggravating factors: laying down at night  Associated factors: no nasal discharge or facial pain    Review of systems covering 10 systems is reviewed as staff entry in other note and pertinent positives and negatives noted.     Past Medical History:   Diagnosis Date    Allergic        Current Outpatient Medications:     ipratropium (ATROVENT) 0.06 % nasal spray, 2 sprays by Each Nostril route nightly as needed for Rhinitis, Disp: 15 mL, Rfl: 3    azelastine (ASTELIN) 0.1 % nasal spray, 1 spray by Nasal route 2 times daily Use in each nostril as directed, Disp: 90 mL, Rfl: 3    azelastine (ASTELIN) 0.1 % nasal spray, INSTILL 2 SPRAYS INTO EACH NOSTRIL TWICE A DAY, Disp: , Rfl:     fluticasone (VERAMYST) 27.5 MCG/SPRAY nasal spray, 2 sprays by Each Nostril route daily, Disp: , Rfl:     montelukast (SINGULAIR) 10 MG tablet, Take 10 mg by mouth nightly, Disp: , Rfl:     albuterol sulfate  (90 BASE) MCG/ACT inhaler, Inhale 2 puffs into the lungs every 6 hours as needed for Wheezing Takes before games, basketball, Disp: , Rfl:     FLOVENT HFA 44 MCG/ACT inhaler, , Disp: , Rfl:     beclomethasone (QVAR) 80 MCG/ACT inhaler, Inhale 2 puffs into the lungs daily (Patient not taking: Reported on 9/22/2021), Disp: , Rfl:     loratadine (CLARITIN) 10 MG tablet, Take 10 mg by mouth daily (Patient not taking: Reported on 9/22/2021), Disp: , Rfl:    No Known Allergies   Past Surgical History:   Procedure Laterality Date    [place] REMOVAL OF NAIL PLATE      SINUS SURGERY  05/21/2021      Social History     Socioeconomic History    Marital status: Single     Spouse name: Not on file    Number of children: Not on file    Years of education: Not on file    Highest education level: Not on file   Occupational History    Not on file   Tobacco Use    Smoking status: Never Smoker    Smokeless tobacco: Never Used   Vaping Use    Vaping Use: Never used   Substance and Sexual Activity    Alcohol use: Not on file    Drug use: Not on file    Sexual activity: Not on file   Other Topics Concern    Not on file   Social History Narrative    Not on file     Social Determinants of Health     Financial Resource Strain:     Difficulty of Paying Living Expenses:    Food Insecurity:     Worried About Running Out of Food in the Last Year:     [address] in the Last Year:    Transportation Needs:     Lack of Transportation (Medical):  Lack of Transportation (Non-Medical):    Physical Activity:     Days of Exercise per Week:     Minutes of Exercise per Session:    Stress:     Feeling of Stress :    Social Connections:     Frequency of Communication with Friends and Family:     Frequency of Social Gatherings with Friends and Family:     Attends Restorationism Services:     Active Member of Clubs or Organizations:     Attends Club or Organization Meetings:     Marital Status:    Intimate Partner Violence:     Fear of Current or Ex-Partner:     Emotionally Abused:     Physically Abused:     Sexually Abused:      No family history on file.      PHYSICAL EXAM:    The patient was examined today 10/5/2021 with findings as follows:    CONSTITUTIONAL:    General Appearance: well-appearing, nontoxic, alert, no acute distress     Communication: understanding at normal conversational tones, normal voicing, speech intelligible    HEAD/FACE:    Head: atraumatic, normocephalic, no lesions    Facial Inspection: no lesions, healthy skin    Facial Strength: motor strength normal, symmetric strength, symmetric movement, motor strength    Sinuses: no sinus tenderness, unable to fully visualize, see endoscopy note, sinusotomy sites patent with minimal polypoid inflammation, isolated small polyp media to the left middle turbinate    Salivary Glands: no enlargements of parotid glands, no tenderness of parotid glands, no masses of parotid glands, clear salivary flow on palpation from Stensen's ducts, no duct stones of Stensen's duct, no enlargement of submandibular glands, no tenderness of submandibular glands, no masses of submandibular glands, clear salivary flow from [place]'s ducts, no stones of [place]'s ducts    Temporomandibular Joint: no crepitus with motion, no tenderness on palpation, no trismus, motion symmetric    EYES:    Pupils: PERRLA, extra-ocular movements intact, no nystagmus, sclera white, no redness of eyes, no watering of eyes    EARS:    Bilateral External Ears: no pits, no tags    Right External Ear: normally formed, no lesions, no mastoid tenderness    Left External Ear: normally formed, no lesions, no mastoid tenderness    Right External Auditory Canal: normal, healthy skin, no obstructing cerumen, no discharge    Left External Auditory Canal: normal, healthy skin, no obstructing cerumen, no discharge    Right Tympanic Membrane: normal landmarks, translucent, mobile to pneumatic otoscopy, no perforation    Left Tympanic Membrane: normal landmarks, translucent, mobile to pneumatic otoscopy, no perforation    Hearing: intact to spoken voice    NOSE:    Nasal Skin: no lesions, no lacerations, no scars    Nasal Dorsum: symmetric with no visible or palpable deformities    Nasal Tip: normal symmetric nasal tip, normal nasal valves    Nasal Mucosa: boggy    Septum: not markedly deformed, midline, no exposed vessels, no bleeding, no septal granuloma    Turbinates: 3+ size, erythematous    Nasopharynx: normal    ORAL CAVITY/MOUTH:    Lips, teeth, gums: normal lips, normal gums, dentition intact, no dental pain on palpation    Oral Mucosa: normal, moist, no lesions    Palate: normal hard palate, normal soft palate, symmetric palatal elevation    Floor of Mouth: normal floor of mouth    Tongue: normal tongue, no lesions, no edema, no masses, normal mucosa, mobile    Tonsils: normal tonsils, symmetric, no lesions    Posterior pharynx: normal    NECK:    Neck: no masses, trachea midline, normal range of motion, no cysts or pits, no tenderness to palpation    Thyroid: normal thyroid, no enlargement, no tenderness, no nodules    LYMPH NODES:    Cervical: no palpable lymph node enlargement    SKIN:    General Appearance: no lesions, warm and dry, normal turgor, no bruising    NEUROLOGICAL SYSTEM:    Orientation: oriented to time, oriented to place, oriented to person    PSYCHIATRIC:    Mood and affect: normal mood, normal affect      FIBEROPTIC NASOPHARYNGOSCOPY PROCEDURE NOTE ([id number])    PROCEDURE PERFORMED BY: Lenard Blackmon MD    PROCEDURE DATE: 10/5/2021    With the patient and/or caregiver's consent, the patient is positioned in the exam chair. The nasal cavity is then prepared with topical 4% respiratory lidocaine and 0.05% oxymetazoline. Using a flexible endoscope the nasal cavity beginning on the right side is entered. There is normal nasal septal mucosa. The nasal septum is midline. The inferior turbinate is enlarged. The middle turbinate is normal in size with a small polyp medially that is non-obstructing. The ethmoid and frontal nasal recesses are with post-surgical changes. The sphenoid sinus ostea is with post-surgical changes.  The maxillary sinus ostia is with post-surgical changes. The endoscope is then withdrawn and the contralateral side is entered. The inferior turbinate is enlarged. The middle turbinate is normal in size. The ethmoid and frontal nasal recesses are with post-surgical changes. The sphenoid sinus ostea is with post-surgical changes. The maxillary sinus ostia is with post-surgical changes. Examination of nasopharynx shows normal in size adenoid and normal eustachian tube orifices. Velopharyngeal closure is normal.  Examination of the pharynx reveals the lateral and posterior pharyngeal wall mucosa is normal. Tonsils are normal in size. This completed the procedure with the patient having tolerated the procedure well. Assessment and Plan:    He is concerned about nighttime nasal congestion as a sign of recurrent nasal polyps. Clinically, he has vascular engorgement of the inferior nasal turbinates, but no significant regrowth of his nasal polys and reassurance is offered. He does have a small left polyp, but his complaints are predominantly right sided and thus this does not seem to be a significant findings. Removal in the office if this becomes bothersome is offered. I have suggested the use of Atrovent nasal spray at night for symptomatic nasal congestion. Diagnosis Orders   1. Vasomotor rhinitis  ipratropium (ATROVENT) 0.06 % nasal spray   2. Allergic rhinitis due to animal hair and dander  azelastine (ASTELIN) 0.1 % nasal spray   3. Nasal polyps        Return in about 6 months (around 4/5/2022). The patient and/or caregiver is to notify the office if no improvement or worsening of symptoms is noted prior to the scheduled follow-up for sooner evaluation. The patient and/or caregiver is able to state an understanding of these recommendations and is agreeable to the treatment plan. --Rob Montez MD on 10/5/2021 at 10:39 AM    An electronic signature was used to authenticate this note.

## 2023-04-06 ENCOUNTER — HOSPITAL ENCOUNTER (OUTPATIENT)
Age: 23
Discharge: HOME | End: 2023-04-06
Payer: COMMERCIAL

## 2023-04-06 PROCEDURE — 36415 COLL VENOUS BLD VENIPUNCTURE: CPT

## 2023-04-06 PROCEDURE — 86707 HEPATITIS BE ANTIBODY: CPT

## 2023-04-08 LAB — HEPATITIS BE ANTIBODY: NEGATIVE

## 2023-12-27 ENCOUNTER — HOSPITAL ENCOUNTER (OUTPATIENT)
Age: 23
Discharge: HOME | End: 2023-12-27
Payer: COMMERCIAL

## 2023-12-27 LAB — HBV SURFACE AB SERPL IA-ACNC: 705.5 MIU/ML

## 2023-12-27 PROCEDURE — 86317 IMMUNOASSAY INFECTIOUS AGENT: CPT

## 2023-12-27 PROCEDURE — 36415 COLL VENOUS BLD VENIPUNCTURE: CPT

## 2024-05-06 ENCOUNTER — TELEPHONE (OUTPATIENT)
Dept: UROLOGY | Age: 24
End: 2024-05-06

## 2024-05-06 NOTE — TELEPHONE ENCOUNTER
Mom called (she works in radiology [phone]) and patient will be home Friday and then not again until August. 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  He has low testosterone and she wants him seen.  The results are in care everywhere.  Dr. CRAWLEY does not have an opening on Friday and I didn't know if he wanted to squeeze him in.  I told mom I would call her back.

## 2024-05-10 ENCOUNTER — OFFICE VISIT (OUTPATIENT)
Dept: UROLOGY | Age: 24
End: 2024-05-10

## 2024-05-10 VITALS
TEMPERATURE: 97.4 F | BODY MASS INDEX: 23.46 KG/M2 | DIASTOLIC BLOOD PRESSURE: 77 MMHG | SYSTOLIC BLOOD PRESSURE: 120 MMHG | HEART RATE: 65 BPM | WEIGHT: 173 LBS

## 2024-05-10 DIAGNOSIS — R79.89 LOW TESTOSTERONE: Primary | ICD-10-CM

## 2024-05-10 DIAGNOSIS — N52.9 ERECTILE DYSFUNCTION, UNSPECIFIED ERECTILE DYSFUNCTION TYPE: ICD-10-CM

## 2024-05-10 RX ORDER — TADALAFIL 5 MG/1
5 TABLET ORAL DAILY
Qty: 30 TABLET | Refills: 5 | Status: SHIPPED | OUTPATIENT
Start: 2024-05-10

## 2024-05-10 RX ORDER — SILDENAFIL 100 MG/1
100 TABLET, FILM COATED ORAL PRN
COMMUNITY
Start: 2024-04-03 | End: 2024-05-10

## 2024-05-10 NOTE — PROGRESS NOTES
HPI:          Patient is a 23 y.o. male in no acute distress.  He is alert and oriented to person, place, and time.         Patient being seen here today as a new patient.  Patient did have his testosterone checked recently.  Patient did have a low testosterone as well as a low free testosterone.  Patient got his testosterone checked because he was having difficulty with libido.  He was also having trouble with erections.  He has had intermittent trouble with erections for the past few years.  Patient has tried some Viagra recently.  This did help him slightly.  He has had no testing or nor is never seen urology.  He reports no testicular or scrotal surgeries as a child.  Patient has no flank pain nausea or vomiting.  He has no issues with urination.  No recent gross hematuria or dysuria.    Past Medical History:   Diagnosis Date    Allergic      Past Surgical History:   Procedure Laterality Date    AL AVULSION NAIL PLATE PARTIAL/COMPLETE SIMPLE 1      SINUS SURGERY  05/21/2021     Outpatient Encounter Medications as of 5/10/2024   Medication Sig Dispense Refill    sildenafil (VIAGRA) 100 MG tablet Take 1 tablet by mouth as needed      azelastine (ASTELIN) 0.1 % nasal spray 1 spray by Nasal route 2 times daily Use in each nostril as directed 90 mL 3    azelastine (ASTELIN) 0.1 % nasal spray INSTILL 2 SPRAYS INTO EACH NOSTRIL TWICE A DAY      FLOVENT HFA 44 MCG/ACT inhaler       montelukast (SINGULAIR) 10 MG tablet Take 1 tablet by mouth nightly      albuterol sulfate  (90 BASE) MCG/ACT inhaler Inhale 2 puffs into the lungs every 6 hours as needed for Wheezing Takes before games, basketball      ipratropium (ATROVENT) 0.06 % nasal spray 2 sprays by Each Nostril route nightly as needed for Rhinitis (Patient not taking: Reported on 5/10/2024) 15 mL 3    fluticasone (VERAMYST) 27.5 MCG/SPRAY nasal spray 2 sprays by Each Nostril route daily (Patient not taking: Reported on 5/10/2024)      beclomethasone (QVAR)

## 2024-05-11 ENCOUNTER — HOSPITAL ENCOUNTER (OUTPATIENT)
Age: 24
Discharge: HOME | End: 2024-05-11
Payer: COMMERCIAL

## 2024-05-11 DIAGNOSIS — R79.89 LOW TESTOSTERONE: ICD-10-CM

## 2024-05-11 LAB
FSH SERPL-ACNC: 4.5 MIU/ML (ref 1.5–12.4)
LH SERPL-ACNC: 7.6 MIU/ML (ref 1.7–8.6)
PROLACTIN SERPL-MCNC: 36.8 NG/ML (ref 4.04–15.2)
SHBG SERPL-SCNC: 27 NMOL/L (ref 17–56)
TESTOST FREE MFR SERPL: 148.6 PG/ML (ref 47–244)
TESTOST SERPL-MCNC: 613 NG/DL (ref 249–836)
TESTOSTERONE, BIOAVAILABLE: 348.2 NG/DL (ref 130–680)

## 2024-05-11 PROCEDURE — 84270 ASSAY OF SEX HORMONE GLOBUL: CPT

## 2024-05-11 PROCEDURE — 83001 ASSAY OF GONADOTROPIN (FSH): CPT

## 2024-05-11 PROCEDURE — 84403 ASSAY OF TOTAL TESTOSTERONE: CPT

## 2024-05-11 PROCEDURE — 84146 ASSAY OF PROLACTIN: CPT

## 2024-05-11 PROCEDURE — 83002 ASSAY OF GONADOTROPIN (LH): CPT

## 2024-05-11 PROCEDURE — 36415 COLL VENOUS BLD VENIPUNCTURE: CPT

## 2024-05-23 ENCOUNTER — TELEMEDICINE (OUTPATIENT)
Dept: UROLOGY | Age: 24
End: 2024-05-23

## 2024-05-23 DIAGNOSIS — E22.1 HYPERPROLACTINEMIA (HCC): Primary | ICD-10-CM

## 2024-05-23 ASSESSMENT — ENCOUNTER SYMPTOMS
ABDOMINAL PAIN: 0
WHEEZING: 0
BACK PAIN: 0
NAUSEA: 0
EYE REDNESS: 0
VOMITING: 0
COUGH: 0
COLOR CHANGE: 0
CONSTIPATION: 0
SHORTNESS OF BREATH: 0

## 2024-05-23 NOTE — PROGRESS NOTES
2024    TELEHEALTH EVALUATION -- Audio/Visual   HPI:    Timothy Benson (:  2000) has requested an audio/video evaluation for the following concern(s):    History  Patient being seen here today as a new patient.  Patient did have his testosterone checked recently.  Patient did have a low testosterone as well as a low free testosterone.  Patient got his testosterone checked because he was having difficulty with libido.  He was also having trouble with erections.  He has had intermittent trouble with erections for the past few years.  Patient has tried some Viagra recently.  This did help him slightly.  He has had no testing or nor is never seen urology.  He reports no testicular or scrotal surgeries as a child.  Patient has no flank pain nausea or vomiting.  He has no issues with urination.  No recent gross hematuria or dysuria.     Currently  Patient being seen here today via telehealth.  Patient did have recent laboratory testing.  Patient had low testosterone.  We did evaluate him further from an endocrine standpoint.  Patient did have a slightly elevated prolactin level at 36.8.  Patient did have a normal LH at 7.6.  Patient had a normal FSH 4.5.  Patient to get a repeat testosterone.  Testosterone was 613 to this time.  With a free testosterone of 148.6        Review of Systems   Constitutional:  Negative for appetite change, chills and fever.   Eyes:  Negative for redness and visual disturbance.   Respiratory:  Negative for cough, shortness of breath and wheezing.    Cardiovascular:  Negative for chest pain and leg swelling.   Gastrointestinal:  Negative for abdominal pain, constipation, nausea and vomiting.   Genitourinary:  Negative for decreased urine volume, difficulty urinating, dysuria, enuresis, flank pain, frequency, hematuria, penile discharge, penile pain, scrotal swelling, testicular pain and urgency.   Musculoskeletal:  Negative for back pain, joint swelling and myalgias.   Skin:

## 2024-05-28 ENCOUNTER — TELEPHONE (OUTPATIENT)
Dept: UROLOGY | Age: 24
End: 2024-05-28

## 2024-05-28 ENCOUNTER — HOSPITAL ENCOUNTER (OUTPATIENT)
Dept: MRI IMAGING | Age: 24
Discharge: HOME | End: 2024-05-30
Payer: COMMERCIAL

## 2024-05-28 DIAGNOSIS — E22.1 HYPERPROLACTINEMIA (HCC): Primary | ICD-10-CM

## 2024-05-28 DIAGNOSIS — R79.89 LOW TESTOSTERONE: ICD-10-CM

## 2024-05-28 DIAGNOSIS — E22.1 HYPERPROLACTINEMIA (HCC): ICD-10-CM

## 2024-05-28 PROCEDURE — 70553 MRI BRAIN STEM W/O & W/DYE: CPT

## 2024-05-28 PROCEDURE — A9579 GAD-BASE MR CONTRAST NOS,1ML: HCPCS | Performed by: UROLOGY

## 2024-05-28 PROCEDURE — 6360000004 HC RX CONTRAST MEDICATION: Performed by: UROLOGY

## 2024-05-28 RX ADMIN — GADOTERIDOL 15 ML: 279.3 INJECTION, SOLUTION INTRAVENOUS at 07:37

## 2024-05-28 NOTE — TELEPHONE ENCOUNTER
MRI of the brain was negative for any abnormalities. There was inflammation in the sinuses, but this appear to be chronic    Repeat testosterone labs and prolactin labs in 4-6 weeks and f/u with a virtual visit. Remember, testosterone labs need check at 8am

## 2024-05-29 NOTE — TELEPHONE ENCOUNTER
Patient is unable to do 815 Friday morning due to class. He was wondering if 12 would be okay? He stated he is unable to miss class and 12 works best for him.    Please advise

## 2024-05-29 NOTE — TELEPHONE ENCOUNTER
Writer called patient advised him of results. Patient voiced understanding. Patient is going to check with his professors to see when the best follow up time would be for 4-6 week follow up. Patient said he would call us back to schedule follow up.   He did still have some questions he wanted to discuss with doctor. Writer spoke to Dr Sykes. Per doctor patient can have virtual visit 8:15 Friday morning. Writer attempted to contact patient to schedule VV visit, left voicemail for patient to call back.

## 2024-05-30 ASSESSMENT — ENCOUNTER SYMPTOMS
WHEEZING: 0
SHORTNESS OF BREATH: 0
COLOR CHANGE: 0
ABDOMINAL PAIN: 0
BACK PAIN: 0
VOMITING: 0
CONSTIPATION: 0
COUGH: 0
EYE REDNESS: 0
NAUSEA: 0

## 2024-05-30 NOTE — PROGRESS NOTES
2024    TELEHEALTH EVALUATION -- Audio/Visual   HPI:    Timothy Benson (:  2000) has requested an audio/video evaluation for the following concern(s):    History  Patient being seen here today as a new patient.  Patient did have his testosterone checked recently.  Patient did have a low testosterone as well as a low free testosterone.  Patient got his testosterone checked because he was having difficulty with libido.  He was also having trouble with erections.  He has had intermittent trouble with erections for the past few years.  Patient has tried some Viagra recently.  This did help him slightly.  He has had no testing or nor is never seen urology.  He reports no testicular or scrotal surgeries as a child.  Patient has no flank pain nausea or vomiting.  He has no issues with urination.  No recent gross hematuria or dysuria.      Currently  Patient being seen via telehealth today.  Patient did have a recent pituitary MRI.  This film was independently reviewed.  Patient did not have any intracranial abnormalities.  He had no discrete sellar or suprasellar lesion.  He did have some sinusitis.  We did discuss the possibility of using medications last point.  Patient is having no other acute symptoms.  No pain today.  Patient is using daily Cialis.  He does feel like he is getting some help with erections.  But in general would like more help.  Patient has no pain today    Review of Systems   Constitutional:  Negative for appetite change, chills and fever.   Eyes:  Negative for redness and visual disturbance.   Respiratory:  Negative for cough, shortness of breath and wheezing.    Cardiovascular:  Negative for chest pain and leg swelling.   Gastrointestinal:  Negative for abdominal pain, constipation, nausea and vomiting.   Genitourinary:  Negative for decreased urine volume, difficulty urinating, dysuria, enuresis, flank pain, frequency, hematuria, penile discharge, penile pain, scrotal swelling,

## 2024-05-31 ENCOUNTER — TELEMEDICINE (OUTPATIENT)
Dept: UROLOGY | Age: 24
End: 2024-05-31

## 2024-05-31 DIAGNOSIS — N52.9 ERECTILE DYSFUNCTION, UNSPECIFIED ERECTILE DYSFUNCTION TYPE: ICD-10-CM

## 2024-05-31 DIAGNOSIS — R79.89 LOW TESTOSTERONE: ICD-10-CM

## 2024-05-31 DIAGNOSIS — E22.1 HYPERPROLACTINEMIA (HCC): Primary | ICD-10-CM

## 2024-05-31 RX ORDER — TADALAFIL 10 MG/1
10 TABLET ORAL PRN
Qty: 30 TABLET | Refills: 3 | Status: SHIPPED | OUTPATIENT
Start: 2024-05-31

## 2024-05-31 RX ORDER — TADALAFIL 5 MG/1
5 TABLET ORAL DAILY
Qty: 30 TABLET | Refills: 5 | Status: SHIPPED | OUTPATIENT
Start: 2024-05-31

## 2024-06-25 DIAGNOSIS — R79.89 LOW TESTOSTERONE: ICD-10-CM

## 2024-06-25 DIAGNOSIS — E22.1 HYPERPROLACTINEMIA (HCC): ICD-10-CM

## 2024-07-01 ENCOUNTER — TELEMEDICINE (OUTPATIENT)
Dept: UROLOGY | Age: 24
End: 2024-07-01
Payer: COMMERCIAL

## 2024-07-01 DIAGNOSIS — N52.9 ERECTILE DYSFUNCTION, UNSPECIFIED ERECTILE DYSFUNCTION TYPE: ICD-10-CM

## 2024-07-01 DIAGNOSIS — R79.89 LOW TESTOSTERONE: ICD-10-CM

## 2024-07-01 DIAGNOSIS — E22.1 HYPERPROLACTINEMIA (HCC): ICD-10-CM

## 2024-07-01 DIAGNOSIS — R79.89 LOW TESTOSTERONE: Primary | ICD-10-CM

## 2024-07-01 PROCEDURE — 99213 OFFICE O/P EST LOW 20 MIN: CPT | Performed by: UROLOGY

## 2024-07-01 ASSESSMENT — ENCOUNTER SYMPTOMS
EYE REDNESS: 0
NAUSEA: 0
ABDOMINAL PAIN: 0
COUGH: 0
SHORTNESS OF BREATH: 0
WHEEZING: 0
VOMITING: 0
BACK PAIN: 0
CONSTIPATION: 0
COLOR CHANGE: 0

## 2024-07-01 NOTE — PROGRESS NOTES
2024    TELEHEALTH EVALUATION -- Audio/Visual   HPI:    Timothy Benson (:  2000) has requested an audio/video evaluation for the following concern(s):    History  Patient being seen here today as a new patient.  Patient did have his testosterone checked recently.  Patient did have a low testosterone as well as a low free testosterone.  Patient got his testosterone checked because he was having difficulty with libido.  He was also having trouble with erections.  He has had intermittent trouble with erections for the past few years.  Patient has tried some Viagra recently.  This did help him slightly.  He has had no testing or nor is never seen urology.  He reports no testicular or scrotal surgeries as a child.  Patient has no flank pain nausea or vomiting.  He has no issues with urination.  No recent gross hematuria or dysuria.      Currently  Patient is here today reviewing repeat laboratory testing.  Patient's prolactin level is normal.  Patient did again have low testosterone levels at 137.  At this point in time he is using the Cialis.  He is happy with the symptom relief.  He reports no pain today.  He is still having some minor issues with libido but these do seem to have improved.  No other significant lower urinary tract symptoms.  No pain today.    Review of Systems   Constitutional:  Negative for appetite change, chills and fever.   Eyes:  Negative for redness and visual disturbance.   Respiratory:  Negative for cough, shortness of breath and wheezing.    Cardiovascular:  Negative for chest pain and leg swelling.   Gastrointestinal:  Negative for abdominal pain, constipation, nausea and vomiting.   Genitourinary:  Negative for decreased urine volume, difficulty urinating, dysuria, enuresis, flank pain, frequency, hematuria, penile discharge, penile pain, scrotal swelling, testicular pain and urgency.   Musculoskeletal:  Negative for back pain, joint swelling and myalgias.   Skin:  Negative for

## 2024-07-29 ENCOUNTER — OFFICE VISIT (OUTPATIENT)
Dept: UROLOGY | Age: 24
End: 2024-07-29
Payer: COMMERCIAL

## 2024-07-29 VITALS
BODY MASS INDEX: 24.28 KG/M2 | SYSTOLIC BLOOD PRESSURE: 99 MMHG | DIASTOLIC BLOOD PRESSURE: 60 MMHG | TEMPERATURE: 98.6 F | HEART RATE: 72 BPM | WEIGHT: 179 LBS

## 2024-07-29 DIAGNOSIS — R79.89 LOW TESTOSTERONE: Primary | ICD-10-CM

## 2024-07-29 DIAGNOSIS — N52.9 ERECTILE DYSFUNCTION, UNSPECIFIED ERECTILE DYSFUNCTION TYPE: ICD-10-CM

## 2024-07-29 PROCEDURE — 99214 OFFICE O/P EST MOD 30 MIN: CPT | Performed by: UROLOGY

## 2024-07-29 RX ORDER — TADALAFIL 5 MG/1
5 TABLET ORAL DAILY
Qty: 90 TABLET | Refills: 3 | Status: SHIPPED | OUTPATIENT
Start: 2024-07-29

## 2024-07-29 RX ORDER — TESTOSTERONE 16.2 MG/G
1 GEL TRANSDERMAL DAILY
Qty: 1 EACH | Refills: 3 | Status: SHIPPED | OUTPATIENT
Start: 2024-07-29 | End: 2025-03-26

## 2024-07-29 ASSESSMENT — ENCOUNTER SYMPTOMS
BACK PAIN: 0
COUGH: 0
NAUSEA: 0
ABDOMINAL PAIN: 0
EYE REDNESS: 0
VOMITING: 0
WHEEZING: 0
CONSTIPATION: 0
COLOR CHANGE: 0
SHORTNESS OF BREATH: 0

## 2024-07-29 NOTE — PROGRESS NOTES
HPI:          Patient is a 24 y.o. male in no acute distress.  He is alert and oriented to person, place, and time.         History  Patient being seen here today as a new patient.  Patient did have his testosterone checked recently.  Patient did have a low testosterone as well as a low free testosterone.  Patient got his testosterone checked because he was having difficulty with libido.  He was also having trouble with erections.  He has had intermittent trouble with erections for the past few years.  Patient has tried some Viagra recently.  This did help him slightly.  He has had no testing or nor is never seen urology.  He reports no testicular or scrotal surgeries as a child.  Patient has no flank pain nausea or vomiting.  He has no issues with urination.  No recent gross hematuria or dysuria.      Currently  Patient is here today to go over erectile dysfunction and low testosterone.  Patient is currently taking daily Cialis with a situational dose to help with erections.  Patient has had low testosterone values.  He is having adequate erections.  The daily Cialis is working for him.  No pain today.  Past Medical History:   Diagnosis Date    Allergic      Past Surgical History:   Procedure Laterality Date    CA AVULSION NAIL PLATE PARTIAL/COMPLETE SIMPLE 1      SINUS SURGERY  05/21/2021     Outpatient Encounter Medications as of 7/29/2024   Medication Sig Dispense Refill    tadalafil (CIALIS) 10 MG tablet Take 1 tablet by mouth as needed for Erectile Dysfunction 30 tablet 3    azelastine (ASTELIN) 0.1 % nasal spray INSTILL 2 SPRAYS INTO EACH NOSTRIL TWICE A DAY      FLOVENT HFA 44 MCG/ACT inhaler       montelukast (SINGULAIR) 10 MG tablet Take 1 tablet by mouth nightly      albuterol sulfate  (90 BASE) MCG/ACT inhaler Inhale 2 puffs into the lungs every 6 hours as needed for Wheezing Takes before games, basketball      loratadine (CLARITIN) 10 MG tablet Take 1 tablet by mouth daily      tadalafil

## 2024-07-30 ENCOUNTER — TELEPHONE (OUTPATIENT)
Dept: UROLOGY | Age: 24
End: 2024-07-30

## 2024-07-30 NOTE — TELEPHONE ENCOUNTER
I initiated a prior auth on the Androgel.  Spoke to Mary at [facility]. Pending case # [id number].

## 2024-09-16 ENCOUNTER — TELEMEDICINE (OUTPATIENT)
Dept: UROLOGY | Age: 24
End: 2024-09-16
Payer: COMMERCIAL

## 2024-09-16 DIAGNOSIS — R79.89 LOW TESTOSTERONE: Primary | ICD-10-CM

## 2024-09-16 DIAGNOSIS — N52.9 ERECTILE DYSFUNCTION, UNSPECIFIED ERECTILE DYSFUNCTION TYPE: ICD-10-CM

## 2024-09-16 PROCEDURE — 99214 OFFICE O/P EST MOD 30 MIN: CPT | Performed by: UROLOGY

## 2024-09-18 DIAGNOSIS — R79.89 LOW TESTOSTERONE: ICD-10-CM

## 2024-10-04 DIAGNOSIS — N52.9 ERECTILE DYSFUNCTION, UNSPECIFIED ERECTILE DYSFUNCTION TYPE: ICD-10-CM

## 2024-10-04 RX ORDER — TADALAFIL 10 MG/1
10 TABLET ORAL PRN
Qty: 30 TABLET | Refills: 3 | Status: SHIPPED | OUTPATIENT
Start: 2024-10-04

## 2024-10-04 NOTE — TELEPHONE ENCOUNTER
Received a refill request from [facility] for Tadalafil  10 mg po qd.     Last appt   9/16/2024   Next appt   12/16/2024     Medication is active on current medication list.

## 2025-02-06 ENCOUNTER — TELEMEDICINE (OUTPATIENT)
Dept: UROLOGY | Age: 25
End: 2025-02-06
Payer: COMMERCIAL

## 2025-02-06 DIAGNOSIS — N52.9 ERECTILE DYSFUNCTION, UNSPECIFIED ERECTILE DYSFUNCTION TYPE: Primary | ICD-10-CM

## 2025-02-06 DIAGNOSIS — R79.89 LOW TESTOSTERONE: ICD-10-CM

## 2025-02-06 PROCEDURE — 99214 OFFICE O/P EST MOD 30 MIN: CPT | Performed by: UROLOGY

## 2025-02-06 RX ORDER — TESTOSTERONE 1.62 MG/G
1 GEL TRANSDERMAL DAILY
Qty: 1 EACH | Refills: 3 | Status: SHIPPED | OUTPATIENT
Start: 2025-02-06 | End: 2025-10-04

## 2025-02-06 NOTE — PROGRESS NOTES
2025    TELEHEALTH EVALUATION -- Audio/Visual (During COVID-19 public health emergency)    HPI:    Timothy Benson (:  2000) has requested an audio/video evaluation for the following concern(s):    History  Patient being seen here today as a new patient.  Patient did have his testosterone checked recently.  Patient did have a low testosterone as well as a low free testosterone.  Patient got his testosterone checked because he was having difficulty with libido.  He was also having trouble with erections.  He has had intermittent trouble with erections for the past few years.  Patient has tried some Viagra recently.  This did help him slightly.  He has had no testing or nor is never seen urology.  He reports no testicular or scrotal surgeries as a child.  Patient has no flank pain nausea or vomiting.  He has no issues with urination.  No recent gross hematuria or dysuria.      Currently  Patient has a 3-month follow-up.  Patient is having good results with both his AndroGel and daily Cialis.  Patient is having no side effects from the medication.  He reports adequate relief in libido and fatigue.  Patient has no pain.  He is tolerating the medications well.  He is currently not using Cialis.  He is happy with his erectile function.  No pain today  Review of Systems    Allergies   Allergen Reactions    Ibuprofen Angioedema and Shortness Of Breath       Prior to Visit Medications    Medication Sig Taking? Authorizing Provider   tadalafil (CIALIS) 10 MG tablet Take 1 tablet by mouth as needed for Erectile Dysfunction  Yanci Swan, APRN - CNP   tadalafil (CIALIS) 5 MG tablet Take 1 tablet by mouth daily  Chaz Sykes MD   Testosterone (ANDROGEL) 20.25 MG/ACT (1.62%) GEL gel Place 1 actuation onto the skin daily for 240 days. Max Daily Amount: 1,250 mg  Chaz Sykes MD   tadalafil (CIALIS) 5 MG tablet Take 1 tablet by mouth daily  Chaz Sykes MD   ipratropium (ATROVENT) 0.06 % nasal

## 2025-04-22 DIAGNOSIS — N52.9 ERECTILE DYSFUNCTION, UNSPECIFIED ERECTILE DYSFUNCTION TYPE: ICD-10-CM

## 2025-04-22 DIAGNOSIS — R79.89 LOW TESTOSTERONE: ICD-10-CM

## 2025-04-23 RX ORDER — TESTOSTERONE 20.25 MG/1.25G
GEL TOPICAL
Qty: 75 G | Refills: 1 | OUTPATIENT
Start: 2025-04-23

## 2025-05-12 ENCOUNTER — TELEPHONE (OUTPATIENT)
Dept: UROLOGY | Age: 25
End: 2025-05-12

## 2025-05-12 ENCOUNTER — RESULTS FOLLOW-UP (OUTPATIENT)
Dept: UROLOGY | Age: 25
End: 2025-05-12

## 2025-05-12 DIAGNOSIS — N52.9 ERECTILE DYSFUNCTION, UNSPECIFIED ERECTILE DYSFUNCTION TYPE: ICD-10-CM

## 2025-05-12 DIAGNOSIS — R79.89 LOW TESTOSTERONE: ICD-10-CM

## 2025-05-16 ENCOUNTER — OFFICE VISIT (OUTPATIENT)
Dept: UROLOGY | Age: 25
End: 2025-05-16

## 2025-05-16 VITALS
TEMPERATURE: 98.6 F | HEART RATE: 83 BPM | DIASTOLIC BLOOD PRESSURE: 70 MMHG | WEIGHT: 165 LBS | HEIGHT: 72 IN | SYSTOLIC BLOOD PRESSURE: 133 MMHG | BODY MASS INDEX: 22.35 KG/M2

## 2025-05-16 DIAGNOSIS — R79.89 LOW TESTOSTERONE: ICD-10-CM

## 2025-05-16 DIAGNOSIS — N52.9 ERECTILE DYSFUNCTION, UNSPECIFIED ERECTILE DYSFUNCTION TYPE: ICD-10-CM

## 2025-05-16 RX ORDER — TESTOSTERONE 1.62 MG/G
2 GEL TRANSDERMAL DAILY
Qty: 3 EACH | Refills: 0 | Status: SHIPPED | OUTPATIENT
Start: 2025-05-16 | End: 2025-08-14

## 2025-05-16 NOTE — PROGRESS NOTES
nightly as needed for Rhinitis (Patient not taking: Reported on 5/16/2025) 15 mL 3    azelastine (ASTELIN) 0.1 % nasal spray INSTILL 2 SPRAYS INTO EACH NOSTRIL TWICE A DAY (Patient not taking: Reported on 5/16/2025)      loratadine (CLARITIN) 10 MG tablet Take 1 tablet by mouth daily (Patient not taking: Reported on 5/16/2025)       No facility-administered encounter medications on file as of 5/16/2025.      Current Outpatient Medications on File Prior to Visit   Medication Sig Dispense Refill    Testosterone (ANDROGEL) 20.25 MG/ACT (1.62%) GEL gel Place 1 actuation onto the skin daily for 240 days. Max Daily Amount: 1,250 mg 1 each 3    FLOVENT HFA 44 MCG/ACT inhaler       montelukast (SINGULAIR) 10 MG tablet Take 1 tablet by mouth nightly      albuterol sulfate  (90 BASE) MCG/ACT inhaler Inhale 2 puffs into the lungs every 6 hours as needed for Wheezing Takes before games, basketball      ipratropium (ATROVENT) 0.06 % nasal spray 2 sprays by Each Nostril route nightly as needed for Rhinitis (Patient not taking: Reported on 5/16/2025) 15 mL 3    azelastine (ASTELIN) 0.1 % nasal spray INSTILL 2 SPRAYS INTO EACH NOSTRIL TWICE A DAY (Patient not taking: Reported on 5/16/2025)      loratadine (CLARITIN) 10 MG tablet Take 1 tablet by mouth daily (Patient not taking: Reported on 5/16/2025)       No current facility-administered medications on file prior to visit.     Ibuprofen  No family history on file.  Social History     Tobacco Use   Smoking Status Never   Smokeless Tobacco Never       Social History     Substance and Sexual Activity   Alcohol Use None         /70 (BP Site: Left Upper Arm, Patient Position: Sitting, BP Cuff Size: Medium Adult)   Pulse 83   Temp 98.6 °F (37 °C) (Temporal)   Ht 1.829 m (6')   Wt 74.8 kg (165 lb)   BMI 22.38 kg/m²       PHYSICAL EXAM:  Constitutional: Patient in no acute distress;   Neuro: alert and oriented to person place and time.    Psych: Mood and affect

## 2025-06-04 ENCOUNTER — TELEPHONE (OUTPATIENT)
Dept: UROLOGY | Age: 25
End: 2025-06-04

## 2025-06-04 NOTE — TELEPHONE ENCOUNTER
Patient has appointment 8/11/25 and wants to know if it can be virtual because he will be back at school.

## 2025-07-16 ENCOUNTER — TELEPHONE (OUTPATIENT)
Dept: UROLOGY | Age: 25
End: 2025-07-16

## 2025-07-16 DIAGNOSIS — R79.89 LOW TESTOSTERONE: Primary | ICD-10-CM

## 2025-07-16 NOTE — TELEPHONE ENCOUNTER
Left a message for the patient to return a call to the office.  Writer did put him on the schedule, we need to verify that this will work for him and he would need to get his labs done soon so the results will be here for the appointment.

## 2025-07-16 NOTE — TELEPHONE ENCOUNTER
The patient called and is wondering if he could have another testosterone lab drawn prior to his virtual visit that is scheduled on August 4th.  He notes that he does  virtual visits because he goes to school in [place], however he is in town until July 31st and he is willing and asked if he could be seen in person before going back to school on August 1st.

## 2025-07-16 NOTE — TELEPHONE ENCOUNTER
Can we work him into Dr. CRAWLEY's schedule while he is in town    We will have him get testosterone labs as well as CBC and CMP prior to that visit I will place orders

## 2025-07-18 ENCOUNTER — HOSPITAL ENCOUNTER (OUTPATIENT)
Age: 25
Discharge: HOME | End: 2025-07-18
Payer: COMMERCIAL

## 2025-07-18 DIAGNOSIS — R79.89 LOW TESTOSTERONE: ICD-10-CM

## 2025-07-18 LAB
ALBUMIN SERPL-MCNC: 4.5 G/DL (ref 3.5–5.2)
ALBUMIN/GLOB SERPL: 2.1 {RATIO} (ref 1–2.5)
ALP SERPL-CCNC: 69 U/L (ref 40–129)
ALT SERPL-CCNC: 21 U/L (ref 10–50)
ANION GAP SERPL CALCULATED.3IONS-SCNC: 10 MMOL/L (ref 9–16)
AST SERPL-CCNC: 20 U/L (ref 10–50)
BASOPHILS # BLD: 0.08 K/UL (ref 0–0.2)
BASOPHILS NFR BLD: 1 % (ref 0–2)
BILIRUB SERPL-MCNC: 0.6 MG/DL (ref 0–1.2)
BUN SERPL-MCNC: 21 MG/DL (ref 6–20)
BUN/CREAT SERPL: 19 (ref 9–20)
CALCIUM SERPL-MCNC: 9.5 MG/DL (ref 8.6–10.4)
CHLORIDE SERPL-SCNC: 103 MMOL/L (ref 98–107)
CO2 SERPL-SCNC: 27 MMOL/L (ref 20–31)
CREAT SERPL-MCNC: 1.1 MG/DL (ref 0.7–1.2)
EOSINOPHIL # BLD: 0.62 K/UL (ref 0–0.44)
EOSINOPHILS RELATIVE PERCENT: 11 % (ref 1–4)
ERYTHROCYTE [DISTWIDTH] IN BLOOD BY AUTOMATED COUNT: 12.6 % (ref 11.8–14.4)
GFR, ESTIMATED: >90 ML/MIN/1.73M2
GLUCOSE SERPL-MCNC: 84 MG/DL (ref 74–99)
HCT VFR BLD AUTO: 46.5 % (ref 40.7–50.3)
HGB BLD-MCNC: 15.4 G/DL (ref 13–17)
IMM GRANULOCYTES # BLD AUTO: 0.03 K/UL (ref 0–0.3)
IMM GRANULOCYTES NFR BLD: 1 %
LYMPHOCYTES NFR BLD: 2.41 K/UL (ref 1.1–3.7)
LYMPHOCYTES RELATIVE PERCENT: 40 % (ref 24–43)
MCH RBC QN AUTO: 30 PG (ref 25.2–33.5)
MCHC RBC AUTO-ENTMCNC: 33.1 G/DL (ref 28.4–34.8)
MCV RBC AUTO: 90.6 FL (ref 82.6–102.9)
MONOCYTES NFR BLD: 0.5 K/UL (ref 0.1–1.2)
MONOCYTES NFR BLD: 9 % (ref 3–12)
NEUTROPHILS NFR BLD: 38 % (ref 36–65)
NEUTS SEG NFR BLD: 2.22 K/UL (ref 1.5–8.1)
NRBC BLD-RTO: 0 PER 100 WBC
PLATELET # BLD AUTO: 174 K/UL (ref 138–453)
PMV BLD AUTO: 10.4 FL (ref 8.1–13.5)
POTASSIUM SERPL-SCNC: 4.8 MMOL/L (ref 3.7–5.3)
PROT SERPL-MCNC: 6.6 G/DL (ref 6.6–8.7)
RBC # BLD AUTO: 5.13 M/UL (ref 4.21–5.77)
SHBG SERPL-SCNC: 27 NMOL/L (ref 17–56)
SODIUM SERPL-SCNC: 140 MMOL/L (ref 136–145)
TESTOST FREE MFR SERPL: 79.6 PG/ML (ref 47–244)
TESTOST SERPL-MCNC: 355 NG/DL (ref 249–836)
WBC OTHER # BLD: 5.9 K/UL (ref 3.5–11.3)

## 2025-07-18 PROCEDURE — 84270 ASSAY OF SEX HORMONE GLOBUL: CPT

## 2025-07-18 PROCEDURE — 36415 COLL VENOUS BLD VENIPUNCTURE: CPT

## 2025-07-18 PROCEDURE — 85025 COMPLETE CBC W/AUTO DIFF WBC: CPT

## 2025-07-18 PROCEDURE — 80053 COMPREHEN METABOLIC PANEL: CPT

## 2025-07-18 PROCEDURE — 84403 ASSAY OF TOTAL TESTOSTERONE: CPT

## 2025-07-31 ENCOUNTER — OFFICE VISIT (OUTPATIENT)
Dept: UROLOGY | Age: 25
End: 2025-07-31

## 2025-07-31 VITALS
DIASTOLIC BLOOD PRESSURE: 78 MMHG | BODY MASS INDEX: 23.27 KG/M2 | WEIGHT: 171.6 LBS | HEART RATE: 59 BPM | SYSTOLIC BLOOD PRESSURE: 117 MMHG

## 2025-07-31 DIAGNOSIS — N52.9 ERECTILE DYSFUNCTION, UNSPECIFIED ERECTILE DYSFUNCTION TYPE: ICD-10-CM

## 2025-07-31 DIAGNOSIS — R79.89 LOW TESTOSTERONE: ICD-10-CM

## 2025-07-31 RX ORDER — TESTOSTERONE 1.62 MG/G
2 GEL TRANSDERMAL DAILY
Qty: 3 EACH | Refills: 0 | Status: SHIPPED | OUTPATIENT
Start: 2025-07-31 | End: 2025-10-29

## 2025-07-31 NOTE — PROGRESS NOTES
HPI:          Patient is a 25 y.o. male in no acute distress.  He is alert and oriented to person, place, and time.         History  Patient being seen here today as a new patient.  Patient did have his testosterone checked recently.  Patient did have a low testosterone as well as a low free testosterone.  Patient got his testosterone checked because he was having difficulty with libido.  He was also having trouble with erections.  He has had intermittent trouble with erections for the past few years.  Patient has tried some Viagra recently.  This did help him slightly.  He has had no testing or nor is never seen urology.  He reports no testicular or scrotal surgeries as a child.  Patient has no flank pain nausea or vomiting.  He has no issues with urination.  No recent gross hematuria or dysuria.      Currently  Patient is here today for 3-month follow-up.  Patient is taking AndroGel.  Patient did get a recent testosterone level.  The current value was 355.  Patient to get hematocrit level.  The current level is 46.5.  Patient was switched to 2 activations per daily.  He does not feel like this has helped his symptoms very much.  Patient does feel like his sleep has improved.  He does feel like his stress is lowered currently.  Past Medical History:   Diagnosis Date    Allergic      Past Surgical History:   Procedure Laterality Date    MS AVULSION NAIL PLATE PARTIAL/COMPLETE SIMPLE 1      SINUS SURGERY  05/21/2021     Outpatient Encounter Medications as of 7/31/2025   Medication Sig Dispense Refill    Testosterone (ANDROGEL) 20.25 MG/ACT (1.62%) GEL gel Place 2 actuation onto the skin daily for 90 days. Max Daily Amount: 2,500 mg 3 each 0    FLOVENT HFA 44 MCG/ACT inhaler       montelukast (SINGULAIR) 10 MG tablet Take 1 tablet by mouth nightly      albuterol sulfate  (90 BASE) MCG/ACT inhaler Inhale 2 puffs into the lungs every 6 hours as needed for Wheezing Takes before games, basketball